# Patient Record
Sex: FEMALE | Race: WHITE | NOT HISPANIC OR LATINO | Employment: OTHER | URBAN - NONMETROPOLITAN AREA
[De-identification: names, ages, dates, MRNs, and addresses within clinical notes are randomized per-mention and may not be internally consistent; named-entity substitution may affect disease eponyms.]

---

## 2021-03-27 ENCOUNTER — HOSPITAL ENCOUNTER (INPATIENT)
Facility: HOSPITAL | Age: 86
LOS: 4 days | Discharge: HOME HEALTH | DRG: 205 | End: 2021-03-31
Attending: EMERGENCY MEDICINE | Admitting: FAMILY MEDICINE
Payer: COMMERCIAL

## 2021-03-27 ENCOUNTER — APPOINTMENT (EMERGENCY)
Dept: CT IMAGING | Facility: HOSPITAL | Age: 86
DRG: 205 | End: 2021-03-27
Payer: COMMERCIAL

## 2021-03-27 ENCOUNTER — APPOINTMENT (EMERGENCY)
Dept: RADIOLOGY | Facility: HOSPITAL | Age: 86
DRG: 205 | End: 2021-03-27
Payer: COMMERCIAL

## 2021-03-27 DIAGNOSIS — I50.9 CHF (CONGESTIVE HEART FAILURE) (HCC): ICD-10-CM

## 2021-03-27 DIAGNOSIS — J96.01 ACUTE RESPIRATORY FAILURE WITH HYPOXIA (HCC): ICD-10-CM

## 2021-03-27 DIAGNOSIS — E87.1 HYPONATREMIA: ICD-10-CM

## 2021-03-27 DIAGNOSIS — R09.02 HYPOXIA: Primary | ICD-10-CM

## 2021-03-27 PROBLEM — E78.2 MIXED HYPERLIPIDEMIA: Status: ACTIVE | Noted: 2021-03-27

## 2021-03-27 PROBLEM — E66.01 MORBID OBESITY (HCC): Status: ACTIVE | Noted: 2021-03-27

## 2021-03-27 PROBLEM — I48.0 PAROXYSMAL A-FIB (HCC): Status: ACTIVE | Noted: 2021-03-27

## 2021-03-27 PROBLEM — M10.49 OTHER SECONDARY GOUT, MULTIPLE SITES: Status: ACTIVE | Noted: 2021-03-27

## 2021-03-27 PROBLEM — I10 ESSENTIAL HYPERTENSION: Status: ACTIVE | Noted: 2021-03-27

## 2021-03-27 LAB
ALBUMIN SERPL BCP-MCNC: 3.9 G/DL (ref 3.5–5)
ALP SERPL-CCNC: 61 U/L (ref 46–116)
ALT SERPL W P-5'-P-CCNC: 27 U/L (ref 12–78)
ANION GAP SERPL CALCULATED.3IONS-SCNC: 3 MMOL/L (ref 4–13)
ANION GAP SERPL CALCULATED.3IONS-SCNC: 4 MMOL/L (ref 4–13)
AST SERPL W P-5'-P-CCNC: 37 U/L (ref 5–45)
BACTERIA UR QL AUTO: NORMAL /HPF
BASOPHILS # BLD AUTO: 0.01 THOUSANDS/ΜL (ref 0–0.1)
BASOPHILS NFR BLD AUTO: 0 % (ref 0–1)
BILIRUB SERPL-MCNC: 1.48 MG/DL (ref 0.2–1)
BILIRUB UR QL STRIP: NEGATIVE
BUN SERPL-MCNC: 16 MG/DL (ref 5–25)
BUN SERPL-MCNC: 17 MG/DL (ref 5–25)
CALCIUM SERPL-MCNC: 8.8 MG/DL (ref 8.3–10.1)
CALCIUM SERPL-MCNC: 9.6 MG/DL (ref 8.3–10.1)
CHLORIDE SERPL-SCNC: 88 MMOL/L (ref 100–108)
CHLORIDE SERPL-SCNC: 88 MMOL/L (ref 100–108)
CLARITY UR: CLEAR
CO2 SERPL-SCNC: 32 MMOL/L (ref 21–32)
CO2 SERPL-SCNC: 34 MMOL/L (ref 21–32)
COLOR UR: YELLOW
CREAT SERPL-MCNC: 1.04 MG/DL (ref 0.6–1.3)
CREAT SERPL-MCNC: 1.07 MG/DL (ref 0.6–1.3)
CRP SERPL QL: 3 MG/L
D DIMER PPP FEU-MCNC: 0.77 UG/ML FEU
EOSINOPHIL # BLD AUTO: 0.03 THOUSAND/ΜL (ref 0–0.61)
EOSINOPHIL NFR BLD AUTO: 1 % (ref 0–6)
ERYTHROCYTE [DISTWIDTH] IN BLOOD BY AUTOMATED COUNT: 14.9 % (ref 11.6–15.1)
FLUAV RNA RESP QL NAA+PROBE: NEGATIVE
FLUBV RNA RESP QL NAA+PROBE: NEGATIVE
GFR SERPL CREATININE-BSD FRML MDRD: 47 ML/MIN/1.73SQ M
GFR SERPL CREATININE-BSD FRML MDRD: 48 ML/MIN/1.73SQ M
GLUCOSE SERPL-MCNC: 124 MG/DL (ref 65–140)
GLUCOSE SERPL-MCNC: 135 MG/DL (ref 65–140)
GLUCOSE UR STRIP-MCNC: NEGATIVE MG/DL
HCT VFR BLD AUTO: 36.8 % (ref 34.8–46.1)
HGB BLD-MCNC: 12.3 G/DL (ref 11.5–15.4)
HGB UR QL STRIP.AUTO: ABNORMAL
IMM GRANULOCYTES # BLD AUTO: 0.01 THOUSAND/UL (ref 0–0.2)
IMM GRANULOCYTES NFR BLD AUTO: 0 % (ref 0–2)
KETONES UR STRIP-MCNC: NEGATIVE MG/DL
LACTATE SERPL-SCNC: 2 MMOL/L (ref 0.5–2)
LEUKOCYTE ESTERASE UR QL STRIP: NEGATIVE
LYMPHOCYTES # BLD AUTO: 0.49 THOUSANDS/ΜL (ref 0.6–4.47)
LYMPHOCYTES NFR BLD AUTO: 14 % (ref 14–44)
MCH RBC QN AUTO: 31.9 PG (ref 26.8–34.3)
MCHC RBC AUTO-ENTMCNC: 33.4 G/DL (ref 31.4–37.4)
MCV RBC AUTO: 95 FL (ref 82–98)
MONOCYTES # BLD AUTO: 0.29 THOUSAND/ΜL (ref 0.17–1.22)
MONOCYTES NFR BLD AUTO: 9 % (ref 4–12)
NEUTROPHILS # BLD AUTO: 2.58 THOUSANDS/ΜL (ref 1.85–7.62)
NEUTS SEG NFR BLD AUTO: 76 % (ref 43–75)
NITRITE UR QL STRIP: NEGATIVE
NON-SQ EPI CELLS URNS QL MICRO: NORMAL /HPF
NRBC BLD AUTO-RTO: 0 /100 WBCS
NT-PROBNP SERPL-MCNC: 3204 PG/ML
OSMOLALITY UR/SERPL-RTO: 267 MMOL/KG (ref 282–298)
PH UR STRIP.AUTO: 7.5 [PH]
PLATELET # BLD AUTO: 60 THOUSANDS/UL (ref 149–390)
PMV BLD AUTO: 9.8 FL (ref 8.9–12.7)
POTASSIUM SERPL-SCNC: 3.2 MMOL/L (ref 3.5–5.3)
POTASSIUM SERPL-SCNC: 3.7 MMOL/L (ref 3.5–5.3)
PROT SERPL-MCNC: 7.9 G/DL (ref 6.4–8.2)
PROT UR STRIP-MCNC: ABNORMAL MG/DL
RBC # BLD AUTO: 3.86 MILLION/UL (ref 3.81–5.12)
RBC #/AREA URNS AUTO: NORMAL /HPF
RSV RNA RESP QL NAA+PROBE: NEGATIVE
SARS-COV-2 RNA RESP QL NAA+PROBE: NEGATIVE
SODIUM SERPL-SCNC: 124 MMOL/L (ref 136–145)
SODIUM SERPL-SCNC: 125 MMOL/L (ref 136–145)
SP GR UR STRIP.AUTO: 1.01 (ref 1–1.03)
TROPONIN I SERPL-MCNC: <0.02 NG/ML
UROBILINOGEN UR QL STRIP.AUTO: 0.2 E.U./DL
WBC # BLD AUTO: 3.41 THOUSAND/UL (ref 4.31–10.16)
WBC #/AREA URNS AUTO: NORMAL /HPF

## 2021-03-27 PROCEDURE — 99285 EMERGENCY DEPT VISIT HI MDM: CPT

## 2021-03-27 PROCEDURE — 99222 1ST HOSP IP/OBS MODERATE 55: CPT | Performed by: FAMILY MEDICINE

## 2021-03-27 PROCEDURE — 86140 C-REACTIVE PROTEIN: CPT | Performed by: EMERGENCY MEDICINE

## 2021-03-27 PROCEDURE — 80053 COMPREHEN METABOLIC PANEL: CPT | Performed by: EMERGENCY MEDICINE

## 2021-03-27 PROCEDURE — 85379 FIBRIN DEGRADATION QUANT: CPT | Performed by: EMERGENCY MEDICINE

## 2021-03-27 PROCEDURE — 83935 ASSAY OF URINE OSMOLALITY: CPT | Performed by: FAMILY MEDICINE

## 2021-03-27 PROCEDURE — 71045 X-RAY EXAM CHEST 1 VIEW: CPT

## 2021-03-27 PROCEDURE — 71275 CT ANGIOGRAPHY CHEST: CPT

## 2021-03-27 PROCEDURE — 85025 COMPLETE CBC W/AUTO DIFF WBC: CPT | Performed by: EMERGENCY MEDICINE

## 2021-03-27 PROCEDURE — 80048 BASIC METABOLIC PNL TOTAL CA: CPT | Performed by: FAMILY MEDICINE

## 2021-03-27 PROCEDURE — 96374 THER/PROPH/DIAG INJ IV PUSH: CPT

## 2021-03-27 PROCEDURE — 99285 EMERGENCY DEPT VISIT HI MDM: CPT | Performed by: EMERGENCY MEDICINE

## 2021-03-27 PROCEDURE — G1004 CDSM NDSC: HCPCS

## 2021-03-27 PROCEDURE — 74174 CTA ABD&PLVS W/CONTRAST: CPT

## 2021-03-27 PROCEDURE — 83880 ASSAY OF NATRIURETIC PEPTIDE: CPT | Performed by: EMERGENCY MEDICINE

## 2021-03-27 PROCEDURE — 0241U HB NFCT DS VIR RESP RNA 4 TRGT: CPT | Performed by: EMERGENCY MEDICINE

## 2021-03-27 PROCEDURE — 84300 ASSAY OF URINE SODIUM: CPT | Performed by: FAMILY MEDICINE

## 2021-03-27 PROCEDURE — 81001 URINALYSIS AUTO W/SCOPE: CPT | Performed by: EMERGENCY MEDICINE

## 2021-03-27 PROCEDURE — 83605 ASSAY OF LACTIC ACID: CPT | Performed by: EMERGENCY MEDICINE

## 2021-03-27 PROCEDURE — 36415 COLL VENOUS BLD VENIPUNCTURE: CPT | Performed by: EMERGENCY MEDICINE

## 2021-03-27 PROCEDURE — 75635 CT ANGIO ABDOMINAL ARTERIES: CPT

## 2021-03-27 PROCEDURE — 83930 ASSAY OF BLOOD OSMOLALITY: CPT | Performed by: FAMILY MEDICINE

## 2021-03-27 PROCEDURE — 93005 ELECTROCARDIOGRAM TRACING: CPT

## 2021-03-27 PROCEDURE — 84484 ASSAY OF TROPONIN QUANT: CPT | Performed by: EMERGENCY MEDICINE

## 2021-03-27 RX ORDER — FUROSEMIDE 40 MG/1
40 TABLET ORAL 2 TIMES DAILY
COMMUNITY
Start: 2020-12-09

## 2021-03-27 RX ORDER — CALCIUM CARBONATE 200(500)MG
1000 TABLET,CHEWABLE ORAL DAILY PRN
Status: DISCONTINUED | OUTPATIENT
Start: 2021-03-27 | End: 2021-03-31 | Stop reason: HOSPADM

## 2021-03-27 RX ORDER — ACETAMINOPHEN 325 MG/1
650 TABLET ORAL EVERY 6 HOURS PRN
Status: DISCONTINUED | OUTPATIENT
Start: 2021-03-27 | End: 2021-03-31 | Stop reason: HOSPADM

## 2021-03-27 RX ORDER — ALLOPURINOL 100 MG/1
200 TABLET ORAL DAILY
Status: DISCONTINUED | OUTPATIENT
Start: 2021-03-27 | End: 2021-03-31 | Stop reason: HOSPADM

## 2021-03-27 RX ORDER — AMLODIPINE BESYLATE 5 MG/1
5 TABLET ORAL DAILY
Status: DISCONTINUED | OUTPATIENT
Start: 2021-03-27 | End: 2021-03-31 | Stop reason: HOSPADM

## 2021-03-27 RX ORDER — SODIUM CHLORIDE 9 MG/ML
125 INJECTION, SOLUTION INTRAVENOUS CONTINUOUS
Status: DISCONTINUED | OUTPATIENT
Start: 2021-03-27 | End: 2021-03-28

## 2021-03-27 RX ORDER — PRAVASTATIN SODIUM 40 MG
TABLET ORAL
COMMUNITY
Start: 2021-03-26

## 2021-03-27 RX ORDER — ATENOLOL 25 MG/1
25 TABLET ORAL DAILY
COMMUNITY
Start: 2021-03-19

## 2021-03-27 RX ORDER — MECLIZINE HYDROCHLORIDE 25 MG/1
TABLET ORAL
COMMUNITY
Start: 2021-03-19

## 2021-03-27 RX ORDER — AMLODIPINE BESYLATE 5 MG/1
5 TABLET ORAL DAILY
COMMUNITY
Start: 2021-02-15

## 2021-03-27 RX ORDER — FLUTICASONE PROPIONATE 50 MCG
1 SPRAY, SUSPENSION (ML) NASAL DAILY
COMMUNITY

## 2021-03-27 RX ORDER — POTASSIUM CHLORIDE 20 MEQ/1
40 TABLET, EXTENDED RELEASE ORAL ONCE
Status: COMPLETED | OUTPATIENT
Start: 2021-03-27 | End: 2021-03-27

## 2021-03-27 RX ORDER — BRINZOLAMIDE 10 MG/ML
1 SUSPENSION/ DROPS OPHTHALMIC 3 TIMES DAILY
Status: DISCONTINUED | OUTPATIENT
Start: 2021-03-27 | End: 2021-03-29

## 2021-03-27 RX ORDER — ONDANSETRON 2 MG/ML
4 INJECTION INTRAMUSCULAR; INTRAVENOUS EVERY 6 HOURS PRN
Status: DISCONTINUED | OUTPATIENT
Start: 2021-03-27 | End: 2021-03-31 | Stop reason: HOSPADM

## 2021-03-27 RX ORDER — ATENOLOL 25 MG/1
25 TABLET ORAL DAILY
Status: DISCONTINUED | OUTPATIENT
Start: 2021-03-27 | End: 2021-03-31 | Stop reason: HOSPADM

## 2021-03-27 RX ORDER — METOLAZONE 5 MG/1
5 TABLET ORAL DAILY
COMMUNITY
Start: 2021-03-16 | End: 2021-03-31 | Stop reason: HOSPADM

## 2021-03-27 RX ORDER — PRAVASTATIN SODIUM 40 MG
40 TABLET ORAL DAILY
Status: DISCONTINUED | OUTPATIENT
Start: 2021-03-27 | End: 2021-03-31 | Stop reason: HOSPADM

## 2021-03-27 RX ORDER — FLUTICASONE PROPIONATE 50 MCG
1 SPRAY, SUSPENSION (ML) NASAL DAILY
Status: DISCONTINUED | OUTPATIENT
Start: 2021-03-27 | End: 2021-03-31 | Stop reason: HOSPADM

## 2021-03-27 RX ORDER — ALLOPURINOL 100 MG/1
TABLET ORAL
COMMUNITY
Start: 2020-12-28

## 2021-03-27 RX ORDER — BRINZOLAMIDE 1 %
SUSPENSION, DROPS(FINAL DOSAGE FORM)(ML) OPHTHALMIC (EYE)
COMMUNITY

## 2021-03-27 RX ORDER — BENAZEPRIL HYDROCHLORIDE 10 MG/1
TABLET ORAL
COMMUNITY
Start: 2021-03-22

## 2021-03-27 RX ORDER — FUROSEMIDE 10 MG/ML
40 INJECTION INTRAMUSCULAR; INTRAVENOUS ONCE
Status: COMPLETED | OUTPATIENT
Start: 2021-03-27 | End: 2021-03-27

## 2021-03-27 RX ADMIN — BRINZOLAMIDE 1 DROP: 10 SUSPENSION/ DROPS OPHTHALMIC at 20:04

## 2021-03-27 RX ADMIN — ATENOLOL 25 MG: 25 TABLET ORAL at 15:08

## 2021-03-27 RX ADMIN — POTASSIUM CHLORIDE 40 MEQ: 1500 TABLET, EXTENDED RELEASE ORAL at 09:59

## 2021-03-27 RX ADMIN — PRAVASTATIN SODIUM 40 MG: 40 TABLET ORAL at 15:08

## 2021-03-27 RX ADMIN — ALLOPURINOL 200 MG: 100 TABLET ORAL at 15:08

## 2021-03-27 RX ADMIN — FUROSEMIDE 40 MG: 10 INJECTION, SOLUTION INTRAVENOUS at 09:59

## 2021-03-27 RX ADMIN — AMLODIPINE BESYLATE 5 MG: 5 TABLET ORAL at 15:08

## 2021-03-27 RX ADMIN — IOHEXOL 130 ML: 350 INJECTION, SOLUTION INTRAVENOUS at 09:28

## 2021-03-27 RX ADMIN — SODIUM CHLORIDE 125 ML/HR: 0.9 INJECTION, SOLUTION INTRAVENOUS at 22:27

## 2021-03-27 RX ADMIN — SODIUM CHLORIDE 125 ML/HR: 0.9 INJECTION, SOLUTION INTRAVENOUS at 15:00

## 2021-03-27 RX ADMIN — RIVAROXABAN 20 MG: 20 TABLET, FILM COATED ORAL at 15:08

## 2021-03-27 NOTE — H&P
[address] [address], 80 y o  female MRN: [id number]  Unit/Bed#: -01 Encounter: [id number]  Primary Care Provider: Ragini Jolly DO   Date and time admitted to hospital: 3/27/2021  8:02 AM    * Acute respiratory failure with hypoxia [facility])  Assessment & Plan  Patient has acute respiratory failure with hypoxia  Currently requiring 2 L of oxygen to maintain pulse ox of more than 92%  Does not use any oxygen at home  Acute respiratory failure is probably secondary to atelectasis and obesity hypoventilation syndrome  No evidence of large effusions or pneumonia noted on CT chest   Unlikely pulmonary embolism as patient is already on anticoagulation    Hyponatremia  Assessment & Plan  Patient appears to have hypovolemic hyponatremia at this time  She was recently increased on her diuretics and metolazone was added  She appears to have inadequate oral intake  Will check urine osmolarity, serum osmolarity and serum sodium  Will place on IV fluid hydration for now and recheck BMP q 12 for now  Correct serum sodium by 8 mEq 24 hours  Consult placed to Nephrology  Check TSH  CT chest does not show any evidence of malignancy  Other secondary gout, multiple sites  Assessment & Plan  Stable at this time  Not in exacerbation  Continue allopurinol    Paroxysmal A-fib (HCC)  Assessment & Plan  Currently rate controlled  Continue to atenolol for rate control and Xarelto for anticoagulation  CTA chest shows katheryn cava  No need for venous Dopplers to be done as patient is already on anticoagulation    Mixed hyperlipidemia  Assessment & Plan  Continue statin therapy    Essential hypertension  Assessment & Plan  Blood pressure is currently controlled  Will continue Norvasc and to Lizzie  Hold Lasix, metolazone, Lotensin for now    Morbid obesity ([facility] [address] )  Assessment & Plan  Will need counseling on diet exercise and lifestyle modification    BMI is 36 15      VTE Prophylaxis: Rivaroxaban (Xarelto)  / sequential compression device   Code Status:  Full code  POLST: There is no POLST form on file for this patient (pre-hospital)  Discussion with family:  Discussed with daughter at bedside    Anticipated Length of Stay:  Patient will be admitted on an Inpatient basis with an anticipated length of stay of  more than 2 midnights  Justification for Hospital Stay:  Shortness of breath and electrolyte abnormality    Total Time for Visit, including Counseling / Coordination of Care: 45 minutes  Greater than 50% of this total time spent on direct patient counseling and coordination of care  Chief Complaint:   Shortness of breath    History of Present Illness:    Myrtle Chavez is a 80 y o  female who presents with shortness of breath  Patient states that she has not been feeling well for the last few days  Her legs were getting more swollen and her family doctor added metolazone to her regimen of Lasix  Patient states that she thinks she is eating and drinking okay  Denies any abdominal pain but does complain of some nausea and intermittent episodes of diarrhea  Denies any fevers or chills or sick contacts  Review of Systems:    Review of Systems   Constitutional: Positive for appetite change and fatigue  Negative for chills and fever  HENT: Negative for hearing loss, sore throat and trouble swallowing  Eyes: Negative for photophobia, discharge and visual disturbance  Respiratory: Positive for shortness of breath  Negative for chest tightness  Cardiovascular: Positive for leg swelling  Negative for chest pain and palpitations  Gastrointestinal: Negative for abdominal pain, blood in stool and vomiting  Endocrine: Negative for polydipsia and polyuria  Genitourinary: Negative for difficulty urinating, dysuria, flank pain and hematuria  Musculoskeletal: Negative for back pain and gait problem  Skin: Negative for rash     Allergic/Immunologic: Negative for environmental allergies and food allergies  Neurological: Positive for weakness  Negative for dizziness, seizures, syncope and headaches  Hematological: Does not bruise/bleed easily  Psychiatric/Behavioral: Negative for behavioral problems  All other systems reviewed and are negative  Past Medical and Surgical History:     Past Medical History:   Diagnosis Date    A-fib [facility])     Coronary artery disease     Dizziness     Edema     GERD (gastroesophageal reflux disease)     High cholesterol     Hypertension        Past Surgical History:   Procedure Laterality Date    CHOLECYSTECTOMY OPEN         Meds/Allergies:    Prior to Admission medications    Medication Sig Start Date End Date Taking? Authorizing Provider   allopurinol (ZYLOPRIM) 100 mg tablet TAKE TWO TABLETS BY MOUTH DAILY 12/28/20  Yes Historical Provider, MD   amLODIPine (NORVASC) 5 mg tablet Take 5 mg by mouth daily 2/15/21  Yes Historical Provider, MD   atenolol (TENORMIN) 25 mg tablet Take by mouth 3/19/21  Yes Historical Provider, MD   benazepril (LOTENSIN) 10 mg tablet TAKE ONE TABLET BY MOUTH DAILY  3/22/21  Yes Historical Provider, MD   brinzolamide (Azopt) 1 % ophthalmic suspension    Yes Historical Provider, MD   fluticasone (FLONASE) 50 mcg/act nasal spray    Yes Historical Provider, MD   furosemide (LASIX) 40 mg tablet TAKE ONE TABLET BY MOUTH TWICE DAILY 6 hours apart 12/9/20  Yes Historical Provider, MD   meclizine (ANTIVERT) 25 mg tablet Take by mouth 3/19/21  Yes Historical Provider, MD   metolazone (ZAROXOLYN) 5 mg tablet Take 5 mg by mouth daily 3/16/21 4/15/21 Yes Historical Provider, MD   pravastatin (PRAVACHOL) 40 mg tablet TAKE ONE TABLET BY MOUTH DAILY  3/26/21  Yes Historical Provider, MD   rivaroxaban (Xarelto) 15 mg tablet TAKE ONE TABLET BY MOUTH DAILY  3/22/21  Yes Historical Provider, MD     I have reviewed home medications with patient personally      Allergies: No Known Allergies    Social History: Marital Status:    Social History     Substance and Sexual Activity   Alcohol Use Not Currently     Social History     Tobacco Use   Smoking Status Never Smoker   Smokeless Tobacco Never Used     Social History     Substance and Sexual Activity   Drug Use Not Currently       Family History:    Family History   Problem Relation Age of Onset    Hypertension Father        Physical Exam:     Vitals:   Blood Pressure: 138/55 (03/27/21 1248)  Pulse: 58 (03/27/21 1248)  Temperature: 98 °F (36 7 °C) (03/27/21 1248)  Respirations: 16 (03/27/21 1248)  Height: 5' 6" (167 6 cm) (03/27/21 4842)  Weight - Scale: 102 kg (224 lb) (03/27/21 0811)  SpO2: 92 % (03/27/21 1248)    Physical Exam  Vitals signs and nursing note reviewed  Constitutional:       Appearance: Normal appearance  HENT:      Head: Normocephalic and atraumatic  Right Ear: External ear normal       Left Ear: External ear normal       Nose: Nose normal       Mouth/Throat:      Mouth: Mucous membranes are dry  Eyes:      Pupils: Pupils are equal, round, and reactive to light  Neck:      Musculoskeletal: Normal range of motion and neck supple  Cardiovascular:      Rate and Rhythm: Normal rate and regular rhythm  Heart sounds: Normal heart sounds  Pulmonary:      Effort: Pulmonary effort is normal       Breath sounds: Normal breath sounds  Abdominal:      General: Bowel sounds are normal       Palpations: Abdomen is soft  Tenderness: There is no abdominal tenderness  Musculoskeletal: Normal range of motion  Right lower leg: Edema present  Left lower leg: Edema present  Skin:     General: Skin is warm and dry  Capillary Refill: Capillary refill takes less than 2 seconds  Neurological:      General: No focal deficit present  Mental Status: She is alert and oriented to person, place, and time     Psychiatric:         Mood and Affect: Mood normal              Additional Data:     Lab Results: I have personally reviewed pertinent reports  Results from last 7 days   Lab Units 03/27/21  0825   WBC Thousand/uL 3 41*   HEMOGLOBIN g/dL 12 3   HEMATOCRIT % 36 8   PLATELETS Thousands/uL 60*   NEUTROS PCT % 76*   LYMPHS PCT % 14   MONOS PCT % 9   EOS PCT % 1     Results from last 7 days   Lab Units 03/27/21  0825   SODIUM mmol/L 125*   POTASSIUM mmol/L 3 2*   CHLORIDE mmol/L 88*   CO2 mmol/L 34*   BUN mg/dL 16   CREATININE mg/dL 1 07   ANION GAP mmol/L 3*   CALCIUM mg/dL 9 6   ALBUMIN g/dL 3 9   TOTAL BILIRUBIN mg/dL 1 48*   ALK PHOS U/L 61   ALT U/L 27   AST U/L 37   GLUCOSE RANDOM mg/dL 124                 Results from last 7 days   Lab Units 03/27/21  0825   LACTIC ACID mmol/L 2 0       Imaging: I have personally reviewed pertinent reports  CTA abdominal w run off w wo contrast   Final Result by Saeed Mercado MD (03/27 7738)         1  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 as described above  An intravascular lesion or mass cannot be excluded based on this study alone  Additional imaging is recommended such as an MRI or potentially a CT venogram    2  Diffuse atherosclerotic disease as described above with no significant lesions as described above  3  Soft tissue edema loaded below the knees bilaterally with superficial varicosities present  4  Cardiac enlargement  5  Trace right pleural effusion  6  DJD  The significant findings were discussed with Dr Romel Goldman directly by telephone  Workstation performed: [id number]         XR chest portable   ED Interpretation by Jessie Howell DO (03/27 8011)   Cardiomegaly CHF changes      CTA dissection protocol chest abdomen pelvis w wo contrast    (Results Pending)       EKG, Pathology, and Other Studies Reviewed on Admission:   · EKG:  Sinus rhythm    Allscripts / Epic Records Reviewed: Yes     ** Please Note: This note has been constructed using a voice recognition system   **

## 2021-03-27 NOTE — PLAN OF CARE
Problem: Potential for Falls  Goal: Patient will remain free of falls  Description: INTERVENTIONS:  - Assess patient frequently for physical needs  -  Identify cognitive and physical deficits and behaviors that affect risk of falls    -  [place] fall precautions as indicated by assessment   - Educate patient/family on patient safety including physical limitations  - Instruct patient to call for assistance with activity based on assessment  - Modify environment to reduce risk of injury  - Consider OT/PT consult to assist with strengthening/mobility  Outcome: Progressing     Problem: PAIN - ADULT  Goal: Verbalizes/displays adequate comfort level or baseline comfort level  Description: Interventions:  - Encourage patient to monitor pain and request assistance  - Assess pain using appropriate pain scale  - Administer analgesics based on type and severity of pain and evaluate response  - Implement non-pharmacological measures as appropriate and evaluate response  - Consider cultural and social influences on pain and pain management  - Notify physician/advanced practitioner if interventions unsuccessful or patient reports new pain  Outcome: Progressing     Problem: INFECTION - ADULT  Goal: Absence or prevention of progression during hospitalization  Description: INTERVENTIONS:  - Assess and monitor for signs and symptoms of infection  - Monitor lab/diagnostic results  - Monitor all insertion sites, i e  indwelling lines, tubes, and drains  - Monitor endotracheal if appropriate and nasal secretions for changes in amount and color  - [place] appropriate cooling/warming therapies per order  - Administer medications as ordered  - Instruct and encourage patient and family to use good hand hygiene technique  - Identify and instruct in appropriate isolation precautions for identified infection/condition  Outcome: Progressing  Goal: Absence of fever/infection during neutropenic period  Description: INTERVENTIONS:  - Monitor WBC    Outcome: Progressing     Problem: SAFETY ADULT  Goal: Patient will remain free of falls  Description: INTERVENTIONS:  - Assess patient frequently for physical needs  -  Identify cognitive and physical deficits and behaviors that affect risk of falls    -  [place] fall precautions as indicated by assessment   - Educate patient/family on patient safety including physical limitations  - Instruct patient to call for assistance with activity based on assessment  - Modify environment to reduce risk of injury  - Consider OT/PT consult to assist with strengthening/mobility  Outcome: Progressing  Goal: Maintain or return to baseline ADL function  Description: INTERVENTIONS:  -  Assess patient's ability to carry out ADLs; assess patient's baseline for ADL function and identify physical deficits which impact ability to perform ADLs (bathing, care of mouth/teeth, toileting, grooming, dressing, etc )  - Assess/evaluate cause of self-care deficits   - Assess range of motion  - Assess patient's mobility; develop plan if impaired  - Assess patient's need for assistive devices and provide as appropriate  - Encourage maximum independence but intervene and supervise when necessary  - Involve family in performance of ADLs  - Assess for home care needs following discharge   - Consider OT consult to assist with ADL evaluation and planning for discharge  - Provide patient education as appropriate  Outcome: Progressing  Goal: Maintain or return mobility status to optimal level  Description: INTERVENTIONS:  - Assess patient's baseline mobility status (ambulation, transfers, stairs, etc )    - Identify cognitive and physical deficits and behaviors that affect mobility  - Identify mobility aids required to assist with transfers and/or ambulation (gait belt, sit-to-stand, lift, walker, cane, etc )  - [place] fall precautions as indicated by assessment  - Record patient progress and toleration of activity level on Mobility SBAR; progress patient to next Phase/Stage  - Instruct patient to call for assistance with activity based on assessment  - Consider rehabilitation consult to assist with strengthening/weightbearing, etc   Outcome: Progressing     Problem: DISCHARGE PLANNING  Goal: Discharge to home or other facility with appropriate resources  Description: INTERVENTIONS:  - Identify barriers to discharge w/patient and caregiver  - Arrange for needed discharge resources and transportation as appropriate  - Identify discharge learning needs (meds, wound care, etc )  - Arrange for interpretive services to assist at discharge as needed  - Refer to Case Management Department for coordinating discharge planning if the patient needs post-hospital services based on physician/advanced practitioner order or complex needs related to functional status, cognitive ability, or social support system  Outcome: Progressing     Problem: Knowledge Deficit  Goal: Patient/family/caregiver demonstrates understanding of disease process, treatment plan, medications, and discharge instructions  Description: Complete learning assessment and assess knowledge base    Interventions:  - Provide teaching at level of understanding  - Provide teaching via preferred learning methods  Outcome: Progressing

## 2021-03-27 NOTE — ED PROVIDER NOTES
History  Chief Complaint   Patient presents with    Shortness of Breath     low pulse ox when EMS arrived and increased leg edema     80year-old female complains of difficulty sleeping last night with dyspnea and left lower extremity pain, nonspecific  Also notes ongoing abdominal pain over the past few weeks and lower extremity swelling over the past week  Just past medical history that includes atrial fibrillation, hypertension, currently taking rivaroxaban  She denies chest pain and back pain  She denies fever and cough  Patient lives with son and asked him to call ambulance today  EMS notes oxygen saturation on room air 88%      History provided by:  Patient and EMS personnel  Shortness of Breath  Severity:  Mild  Onset quality:  Gradual  Timing:  Constant  Progression:  Unchanged  Chronicity:  New  Relieved by:  Oxygen  Associated symptoms: abdominal pain    Associated symptoms: no fever and no hemoptysis        Prior to Admission Medications   Prescriptions Last Dose Informant Patient Reported? Taking?   allopurinol (ZYLOPRIM) 100 mg tablet 3/26/2021 at Unknown time  Yes Yes   Sig: TAKE TWO TABLETS BY MOUTH DAILY   amLODIPine (NORVASC) 5 mg tablet 3/26/2021 at Unknown time  Yes Yes   Sig: Take 5 mg by mouth daily   atenolol (TENORMIN) 25 mg tablet 3/26/2021 at Unknown time  Yes Yes   Sig: Take by mouth   benazepril (LOTENSIN) 10 mg tablet 3/26/2021 at Unknown time  Yes Yes   Sig: TAKE ONE TABLET BY MOUTH DAILY     brinzolamide (Azopt) 1 % ophthalmic suspension 3/26/2021 at Unknown time  Yes Yes   fluticasone (FLONASE) 50 mcg/act nasal spray 3/26/2021 at Unknown time  Yes Yes   furosemide (LASIX) 40 mg tablet 3/26/2021 at Unknown time  Yes Yes   Sig: TAKE ONE TABLET BY MOUTH TWICE DAILY 6 hours apart   meclizine (ANTIVERT) 25 mg tablet 3/26/2021 at Unknown time  Yes Yes   Sig: Take by mouth   metolazone (ZAROXOLYN) 5 mg tablet 3/26/2021 at Unknown time  Yes Yes   Sig: Take 5 mg by mouth daily pravastatin (PRAVACHOL) 40 mg tablet 3/26/2021 at Unknown time  Yes Yes   Sig: TAKE ONE TABLET BY MOUTH DAILY  rivaroxaban (Xarelto) 15 mg tablet 3/26/2021 at Unknown time  Yes Yes   Sig: TAKE ONE TABLET BY MOUTH DAILY  Facility-Administered Medications: None       Past Medical History:   Diagnosis Date    A-fib ([facility] [address] )     Coronary artery disease     Dizziness     Edema     GERD (gastroesophageal reflux disease)     High cholesterol     Hypertension        Past Surgical History:   Procedure Laterality Date    CHOLECYSTECTOMY OPEN         Family History   Problem Relation Age of Onset    Hypertension Father      I have reviewed and agree with the history as documented  E-Cigarette/Vaping    E-Cigarette Use Never User      E-Cigarette/Vaping Substances     Social History     Tobacco Use    Smoking status: Never Smoker    Smokeless tobacco: Never Used   Substance Use Topics    Alcohol use: Not Currently    Drug use: Not Currently       Review of Systems   Constitutional: Negative for fever  Respiratory: Positive for shortness of breath  Negative for hemoptysis  Gastrointestinal: Positive for abdominal pain  All other systems reviewed and are negative  Physical Exam  Physical Exam  Vitals signs and nursing note reviewed  Constitutional:       General: She is not in acute distress  Appearance: She is obese  She is not ill-appearing  Comments: Pleasant, comfortable-appearing, calm, conversational   HENT:      Head: Normocephalic and atraumatic  Eyes:      Conjunctiva/sclera: Conjunctivae normal       Pupils: Pupils are equal, round, and reactive to light  Neck:      Musculoskeletal: Neck supple  Cardiovascular:      Rate and Rhythm: Normal rate and regular rhythm  Heart sounds: Normal heart sounds        Comments: Intact femoral, popliteal pulses bilaterally  Right dorsal pedal pulses intact, but left dorsal pedal not palpable, left toes capillary refill intact, warm  Pulmonary:      Effort: Pulmonary effort is normal       Breath sounds: Rales present  Abdominal:      General: Bowel sounds are normal  There is no distension  Palpations: Abdomen is soft  Tenderness: There is no abdominal tenderness  Musculoskeletal: Normal range of motion  Right lower leg: Edema present  Left lower leg: Edema present  Skin:     General: Skin is warm and dry  Neurological:      Mental Status: She is alert and oriented to person, place, and time  Cranial Nerves: No cranial nerve deficit  Coordination: Coordination normal    Psychiatric:         Behavior: Behavior normal          Thought Content:  Thought content normal          Judgment: Judgment normal          Vital Signs  ED Triage Vitals   Temperature Pulse Respirations Blood Pressure SpO2   03/27/21 0811 03/27/21 0811 03/27/21 0811 03/27/21 0811 03/27/21 0811   98 1 °F (36 7 °C) 64 16 136/62 (!) 89 %      Temp src Heart Rate Source Patient Position - Orthostatic VS BP Location FiO2 (%)   -- 03/27/21 0922 03/27/21 1115 03/27/21 0811 --    Monitor Lying Left arm       Pain Score       03/27/21 0920       3           Vitals:    03/27/21 1100 03/27/21 1115 03/27/21 1248 03/27/21 1713   BP: 145/67 128/61 138/55 109/67   Pulse: 59 58 58 (!) 48   Patient Position - Orthostatic VS:  Lying           Visual Acuity      ED Medications  Medications   amLODIPine (NORVASC) tablet 5 mg (5 mg Oral Given 3/27/21 1508)   allopurinol (ZYLOPRIM) tablet 200 mg (200 mg Oral Given 3/27/21 1508)   atenolol (TENORMIN) tablet 25 mg (25 mg Oral Given 3/27/21 1508)   brinzolamide (AZOPT) 1 % ophthalmic suspension 1 drop (1 drop Both Eyes Refused 3/27/21 1508)   fluticasone (FLONASE) 50 mcg/act nasal spray 1 spray (1 spray Each Nare Refused 3/27/21 1508)   pravastatin (PRAVACHOL) tablet 40 mg (40 mg Oral Given 3/27/21 1508)   sodium chloride 0 9 % infusion (125 mL/hr Intravenous New Bag 3/27/21 1500)   acetaminophen (TYLENOL) tablet 650 mg (has no administration in time range)   ondansetron (ZOFRAN) injection 4 mg (has no administration in time range)   calcium carbonate (TUMS) chewable tablet 1,000 mg (has no administration in time range)   rivaroxaban (XARELTO) tablet 20 mg (20 mg Oral Given 3/27/21 1508)   iohexol (OMNIPAQUE) 350 MG/ML injection (SINGLE-DOSE) 130 mL (130 mL Intravenous Given 3/27/21 0928)   potassium chloride (K-DUR,KLOR-CON) CR tablet 40 mEq (40 mEq Oral Given 3/27/21 0959)   furosemide (LASIX) injection 40 mg (40 mg Intravenous Given 3/27/21 0959)       Diagnostic Studies  Results Reviewed     Procedure Component Value Units Date/Time    Osmolality-"If this is regarding a toxic alcohol, STOP  Test is not routinely indicated  Please consult medical  on call for further guidance " [[id number]] Collected: 03/27/21 0825    Lab Status: In process Specimen: Blood Updated: 03/27/21 1502    Urine Microscopic [[id number]]  (Normal) Collected: 03/27/21 0859    Lab Status: Final result Specimen: Urine, Indwelling Perez Catheter Updated: 03/27/21 0917     RBC, UA 2-4 /hpf      WBC, UA 0-1 /hpf      Epithelial Cells Occasional /hpf      Bacteria, UA Occasional /hpf     COVID19, Influenza A/B, RSV PCR, [facility] [[id number]]  (Normal) Collected: 03/27/21 0825    Lab Status: Final result Specimen: Nares from Nose Updated: 03/27/21 0910     SARS-CoV-2 Negative     INFLUENZA A PCR Negative     INFLUENZA B PCR Negative     RSV PCR Negative    Narrative: This test has been authorized by FDA under an EUA (Emergency Use Assay) for use by authorized laboratories  Clinical caution and judgement should be used with the interpretation of these results with consideration of the clinical impression and other laboratory testing  Testing reported as "Positive" or "Negative" has been proven to be accurate according to standard laboratory validation requirements    All testing is performed with control materials showing appropriate reactivity at standard intervals      UA (URINE) with reflex to Scope [[id number]]  (Abnormal) Collected: 03/27/21 0859    Lab Status: Final result Specimen: Urine, Indwelling Perez Catheter Updated: 03/27/21 0907     Color, UA Yellow     Clarity, UA Clear     Specific Gravity, UA 1 010     pH, UA 7 5     Leukocytes, UA Negative     Nitrite, UA Negative     Protein, UA Trace mg/dl      Glucose, UA Negative mg/dl      Ketones, UA Negative mg/dl      Urobilinogen, UA 0 2 E U /dl      Bilirubin, UA Negative     Blood, UA Trace-lysed    C-reactive protein [[id number]]  (Abnormal) Collected: 03/27/21 0825    Lab Status: Final result Specimen: Blood from Arm, Right Updated: 03/27/21 0856     CRP 3 0 mg/L     Comprehensive metabolic panel [[id number]]  (Abnormal) Collected: 03/27/21 0825    Lab Status: Final result Specimen: Blood from Arm, Right Updated: 03/27/21 0856     Sodium 125 mmol/L      Potassium 3 2 mmol/L      Chloride 88 mmol/L      CO2 34 mmol/L      ANION GAP 3 mmol/L      BUN 16 mg/dL      Creatinine 1 07 mg/dL      Glucose 124 mg/dL      Calcium 9 6 mg/dL      AST 37 U/L      ALT 27 U/L      Alkaline Phosphatase 61 U/L      Total Protein 7 9 g/dL      Albumin 3 9 g/dL      Total Bilirubin 1 48 mg/dL      eGFR 47 ml/min/1 73sq m     Narrative:      [facility] guidelines for Chronic Kid[facility] Disease (CKD):     Stage 1 with normal or high GFR (GFR > 90 mL/min/1 73 square meters)    Stage 2 Mild CKD (GFR = 60-89 mL/min/1 73 square meters)    Stage 3A Moderate CKD (GFR = 45-59 mL/min/1 73 square meters)    Stage 3B Moderate CKD (GFR = 30-44 mL/min/1 73 square meters)    Stage 4 Severe CKD (GFR = 15-29 mL/min/1 73 square meters)    Stage 5 End Stage CKD (GFR <15 mL/min/1 73 square meters)  Note: GFR calculation is accurate only with a steady state creatinine    NT-BNP PRO [[id number]]  (Abnormal) Collected: 03/27/21 0825    Lab Status: Final result Specimen: Blood from Arm, Right Updated: 03/27/21 0856     NT-proBNP 3,204 pg/mL     CBC and differential [[id number]]  (Abnormal) Collected: 03/27/21 0825    Lab Status: Final result Specimen: Blood from Arm, Right Updated: 03/27/21 0854     WBC 3 41 Thousand/uL      RBC 3 86 Million/uL      Hemoglobin 12 3 g/dL      Hematocrit 36 8 %      MCV 95 fL      MCH 31 9 pg      MCHC 33 4 g/dL      RDW 14 9 %      MPV 9 8 fL      Platelets 60 Thousands/uL      nRBC 0 /100 WBCs      Neutrophils Relative 76 %      Immat GRANS % 0 %      Lymphocytes Relative 14 %      Monocytes Relative 9 %      Eosinophils Relative 1 %      Basophils Relative 0 %      Neutrophils Absolute 2 58 Thousands/µL      Immature Grans Absolute 0 01 Thousand/uL      Lymphocytes Absolute 0 49 Thousands/µL      Monocytes Absolute 0 29 Thousand/µL      Eosinophils Absolute 0 03 Thousand/µL      Basophils Absolute 0 01 Thousands/µL     Narrative:       No Clots    Lactic acid, plasma [[id number]]  (Normal) Collected: 03/27/21 0825    Lab Status: Final result Specimen: Blood from Arm, Right Updated: 03/27/21 0854     LACTIC ACID 2 0 mmol/L     Narrative:      Result may be elevated if tourniquet was used during collection  Troponin I [[id number]]  (Normal) Collected: 03/27/21 0825    Lab Status: Final result Specimen: Blood from Arm, Right Updated: 03/27/21 0853     Troponin I <0 02 ng/mL     D-dimer, quantitative [[id number]]  (Abnormal) Collected: 03/27/21 0825    Lab Status: Final result Specimen: Blood from Arm, Right Updated: 03/27/21 0847     D-Dimer, Quant 0 77 ug/ml FEU                  CTA abdominal w run off w wo contrast   Final Result by Noy Aguilar MD (03/27 1259)         1  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 as described above  An intravascular lesion or mass cannot be excluded based on this study alone   Additional imaging is recommended such as an MRI or potentially a CT venogram    2  Diffuse atherosclerotic disease as described above with no significant lesions as described above  3  Soft tissue edema loaded below the knees bilaterally with superficial varicosities present  4  Cardiac enlargement  5  Trace right pleural effusion  6  DJD  The significant findings were discussed with Dr Evgeny Durand directly by telephone  Workstation performed: [id number]         XR chest portable   ED Interpretation by Ewa Villafana DO (03/27 1705)   Cardiomegaly CHF changes      Final Result by Deisy Mascorro DO (03/27 1529)      Central vascular congestion  No focal infiltrates  Workstation performed: [id number]         CTA dissection protocol chest abdomen pelvis w wo contrast    (Results Pending)              Procedures  Procedures         ED Course  ED Course as of Mar 27 1840   Sat Mar 27, 2021   9 [facility]  Vascular Tech Yuliya Edouard notes no arterial studies when on call      0839 EKG 8:34 a m  Atrial fibrillation rate 63 right axis QRS 94 nonspecific T-wave changes no ST elevation or depression interpreted by me      0935 NT-proBNP(!): 3,204   0935 C-REACTIVE PROTEIN(!): 3 0   0935 LACTIC ACID: 2 0   0936 Sodium(!): 125   0936 Potassium(!): 3 2   0936 Troponin I: <0 02   0936 D-Dimer, Quant(!): 0 77   1207 Radiology [facility] enlarged IVC ? mass change, MR vs venogram                                SBIRT 22yo+      Most Recent Value   SBIRT (22 yo +)   In order to provide better care to our patients, we are screening all of our patients for alcohol and drug use  Would it be okay to ask you these screening questions? Yes Filed at: 03/27/2021 3590   Initial Alcohol Screen: US AUDIT-C    1  How often do you have a drink containing alcohol?  0 Filed at: 03/27/2021 0815   2  How many drinks containing alcohol do you have on a typical day you are drinking? 0 Filed at: 03/27/2021 0815   3a  Male UNDER 65: How often do you have five or more drinks on one occasion? 0 Filed at: 03/27/2021 0815   3b  FEMALE Any Age, or MALE 65+:  How often do you have 4 or more drinks on one occassion? 0 Filed at: 03/27/2021 0815   Audit-C Score  0 Filed at: 03/27/2021 1564   KIRILL: How many times in the past year have you    Used an illegal drug or used a prescription medication for non-medical reasons? Never Filed at: 03/27/2021 1245                    MDM    Disposition  Final diagnoses:   Hypoxia   [facility]est[facility] failure) [facility]     Time reflects when diagnosis was documented in both MDM as applicable and the Disposition within this note     Time User Action Codes Description Comment    3/27/2021  9:36 AM Suzanne Martinez Add [R09 02] Hypoxia     3/27/2021  9:36 AM Marylene Boor, Joseph Goodman Add [I50 9] CHF (congestive heart failure) ([facility] )     3/27/2021  2:48 PM Perry Ramirez Add [E87 1] Hyponatremia       ED Disposition     ED Disposition Condition Date/Time Comment    Admit Stable Sat Mar 27, 2021  9:37 AM         Follow-up Information    None         Current Discharge Medication List      CONTINUE these medications which have NOT CHANGED    Details   allopurinol (ZYLOPRIM) 100 mg tablet TAKE TWO TABLETS BY MOUTH DAILY      amLODIPine (NORVASC) 5 mg tablet Take 5 mg by mouth daily      atenolol (TENORMIN) 25 mg tablet Take by mouth      benazepril (LOTENSIN) 10 mg tablet TAKE ONE TABLET BY MOUTH DAILY  brinzolamide (Azopt) 1 % ophthalmic suspension       fluticasone (FLONASE) 50 mcg/act nasal spray       furosemide (LASIX) 40 mg tablet TAKE ONE TABLET BY MOUTH TWICE DAILY 6 hours apart      meclizine (ANTIVERT) 25 mg tablet Take by mouth      metolazone (ZAROXOLYN) 5 mg tablet Take 5 mg by mouth daily      pravastatin (PRAVACHOL) 40 mg tablet TAKE ONE TABLET BY MOUTH DAILY  rivaroxaban (Xarelto) 15 mg tablet TAKE ONE TABLET BY MOUTH DAILY  No discharge procedures on file      PDMP Review     None          ED Provider  Electronically Signed by           Edwin Tavares DO  03/27/21 2901

## 2021-03-27 NOTE — ASSESSMENT & PLAN NOTE
Patient has acute respiratory failure with hypoxia  Currently requiring 2 L of oxygen to maintain pulse ox of more than 92%  Does not use any oxygen at home  Acute respiratory failure is probably secondary to atelectasis and obesity hypoventilation syndrome    No evidence of large effusions or pneumonia noted on CT chest   Unlikely pulmonary embolism as patient is already on anticoagulation

## 2021-03-27 NOTE — ASSESSMENT & PLAN NOTE
Blood pressure is currently controlled  Will continue Norvas and erica Samuel    Hold Lasix, metolazone, Lotensin for now

## 2021-03-27 NOTE — ASSESSMENT & PLAN NOTE
Patient appears to have hypovolemic hyponatremia at this time  She was recently increased on her diuretics and metolazone was added  She appears to have inadequate oral intake  Will check urine osmolarity, serum osmolarity and serum sodium  Will place on IV fluid hydration for now and recheck BMP q 12 for now  Correct serum sodium by 8 mEq 24 hours  Consult placed to Nephrology  Check TSH  CT chest does not show any evidence of malignancy

## 2021-03-27 NOTE — ASSESSMENT & PLAN NOTE
Currently rate controlled  Continue to atenolol for rate control and Xarelto for anticoagulation  CTA chest shows katheryn cava    No need for venous Dopplers to be done as patient is already on anticoagulation

## 2021-03-28 LAB
ANION GAP SERPL CALCULATED.3IONS-SCNC: 2 MMOL/L (ref 4–13)
ANION GAP SERPL CALCULATED.3IONS-SCNC: 3 MMOL/L (ref 4–13)
BUN SERPL-MCNC: 13 MG/DL (ref 5–25)
BUN SERPL-MCNC: 15 MG/DL (ref 5–25)
CALCIUM SERPL-MCNC: 8.6 MG/DL (ref 8.3–10.1)
CALCIUM SERPL-MCNC: 9.1 MG/DL (ref 8.3–10.1)
CHLORIDE SERPL-SCNC: 91 MMOL/L (ref 100–108)
CHLORIDE SERPL-SCNC: 93 MMOL/L (ref 100–108)
CO2 SERPL-SCNC: 33 MMOL/L (ref 21–32)
CO2 SERPL-SCNC: 36 MMOL/L (ref 21–32)
CREAT SERPL-MCNC: 0.82 MG/DL (ref 0.6–1.3)
CREAT SERPL-MCNC: 0.89 MG/DL (ref 0.6–1.3)
ERYTHROCYTE [DISTWIDTH] IN BLOOD BY AUTOMATED COUNT: 14.8 % (ref 11.6–15.1)
GFR SERPL CREATININE-BSD FRML MDRD: 58 ML/MIN/1.73SQ M
GFR SERPL CREATININE-BSD FRML MDRD: 65 ML/MIN/1.73SQ M
GLUCOSE SERPL-MCNC: 90 MG/DL (ref 65–140)
GLUCOSE SERPL-MCNC: 99 MG/DL (ref 65–140)
HCT VFR BLD AUTO: 33.6 % (ref 34.8–46.1)
HGB BLD-MCNC: 11 G/DL (ref 11.5–15.4)
MCH RBC QN AUTO: 31.6 PG (ref 26.8–34.3)
MCHC RBC AUTO-ENTMCNC: 32.7 G/DL (ref 31.4–37.4)
MCV RBC AUTO: 97 FL (ref 82–98)
OSMOLALITY UR: 380 MMOL/KG
PLATELET # BLD AUTO: 55 THOUSANDS/UL (ref 149–390)
PMV BLD AUTO: 10.8 FL (ref 8.9–12.7)
POTASSIUM SERPL-SCNC: 3.5 MMOL/L (ref 3.5–5.3)
POTASSIUM SERPL-SCNC: 3.6 MMOL/L (ref 3.5–5.3)
RBC # BLD AUTO: 3.48 MILLION/UL (ref 3.81–5.12)
SODIUM 24H UR-SCNC: 50 MOL/L
SODIUM SERPL-SCNC: 129 MMOL/L (ref 136–145)
SODIUM SERPL-SCNC: 129 MMOL/L (ref 136–145)
TSH SERPL DL<=0.05 MIU/L-ACNC: 1.32 UIU/ML (ref 0.36–3.74)
WBC # BLD AUTO: 3.02 THOUSAND/UL (ref 4.31–10.16)

## 2021-03-28 PROCEDURE — 99232 SBSQ HOSP IP/OBS MODERATE 35: CPT | Performed by: FAMILY MEDICINE

## 2021-03-28 PROCEDURE — 99222 1ST HOSP IP/OBS MODERATE 55: CPT | Performed by: INTERNAL MEDICINE

## 2021-03-28 PROCEDURE — 80048 BASIC METABOLIC PNL TOTAL CA: CPT | Performed by: FAMILY MEDICINE

## 2021-03-28 PROCEDURE — 84443 ASSAY THYROID STIM HORMONE: CPT | Performed by: FAMILY MEDICINE

## 2021-03-28 PROCEDURE — 85027 COMPLETE CBC AUTOMATED: CPT | Performed by: FAMILY MEDICINE

## 2021-03-28 RX ORDER — FUROSEMIDE 10 MG/ML
40 INJECTION INTRAMUSCULAR; INTRAVENOUS ONCE
Status: COMPLETED | OUTPATIENT
Start: 2021-03-28 | End: 2021-03-28

## 2021-03-28 RX ADMIN — RIVAROXABAN 20 MG: 20 TABLET, FILM COATED ORAL at 07:50

## 2021-03-28 RX ADMIN — SODIUM CHLORIDE 125 ML/HR: 0.9 INJECTION, SOLUTION INTRAVENOUS at 06:05

## 2021-03-28 RX ADMIN — AMLODIPINE BESYLATE 5 MG: 5 TABLET ORAL at 07:50

## 2021-03-28 RX ADMIN — ALLOPURINOL 200 MG: 100 TABLET ORAL at 07:50

## 2021-03-28 RX ADMIN — PRAVASTATIN SODIUM 40 MG: 40 TABLET ORAL at 07:50

## 2021-03-28 RX ADMIN — ATENOLOL 25 MG: 25 TABLET ORAL at 07:50

## 2021-03-28 RX ADMIN — FUROSEMIDE 40 MG: 10 INJECTION, SOLUTION INTRAMUSCULAR; INTRAVENOUS at 09:56

## 2021-03-28 NOTE — PROGRESS NOTES
[address]  Progress Note Mirian Zhang 1933, 80 y o  female MRN: [id number]  Unit/Bed#: -01 Encounter: [id number]  Primary Care Provider: Andres Lane DO   Date and time admitted to hospital: 3/27/2021  8:02 AM    * Acute respiratory failure with hypoxia [facility])  Assessment & Plan  Patient has acute respiratory failure with hypoxia  Currently requiring 2 L of oxygen to maintain pulse ox of more than 92%  Does not use any oxygen at home  Acute respiratory failure is probably secondary to atelectasis and obesity hypoventilation syndrome  No evidence of large effusions or pneumonia noted on CT chest   Unlikely pulmonary embolism as patient is already on anticoagulation    Hyponatremia  Assessment & Plan  Patient appears to have hypovolemic hyponatremia at this time  She was recently increased on her diuretics and metolazone was added  She appears to have inadequate oral intake  Will check urine osmolarity, serum osmolarity and serum sodium  Sodium improved to 129 this morning  Discontinue IV fluids  Received 1 dose of IV Lasix  recheck BMP q 12 for now  Correct serum sodium by 8 mEq 24 hours  Baseline sodium is 135-138  Appreciate input from Nephrology  Normal TSH  CT chest does not show any evidence of malignancy  Other secondary gout, multiple sites  Assessment & Plan  Stable at this time  Not in exacerbation  Continue allopurinol    Paroxysmal A-fib (HCC)  Assessment & Plan  Currently rate controlled  Continue to atenolol for rate control and Xarelto for anticoagulation  CTA chest shows katheryn cava  No need for venous Dopplers to be done as patient is already on anticoagulation    Mixed hyperlipidemia  Assessment & Plan  Continue statin therapy    Essential hypertension  Assessment & Plan  Blood pressure is currently controlled  Will continue Norvasc and erica Ortiz Cancel    Hold Lasix, metolazone, Lotensin for now    Morbid obesity ([facility] [address] )  Assessment & Plan  Will need counseling on diet exercise and lifestyle modification  BMI is 36 15      VTE Pharmacologic Prophylaxis:   Pharmacologic: Rivaroxaban (Xarelto)  Mechanical VTE Prophylaxis in Place: Yes    Patient Centered Rounds: I have performed bedside rounds with nursing staff today  Discussions with Specialists or Other Care Team Provider:  Discussed with Nephrology    Education and Discussions with Family / Patient:  Discussed with patient at bedside    Time Spent for Care: 45 minutes  More than 50% of total time spent on counseling and coordination of care as described above  Current Length of Stay: 1 day(s)    Current Patient Status: Inpatient   Certification Statement: The patient will continue to require additional inpatient hospital stay due to Acute hyponatremia    Discharge Plan:  Pending progress  Monitor BMP q 12    Code Status: Level 1 - Full Code      Subjective:   Patient denies any chest pain but does feel little short of breath today  Denies any nausea vomiting or diarrhea  Objective:     Vitals:   Temp (24hrs), Av 3 °F (36 8 °C), Min:98 2 °F (36 8 °C), Max:98 4 °F (36 9 °C)    Temp:  [98 2 °F (36 8 °C)-98 4 °F (36 9 °C)] 98 2 °F (36 8 °C)  HR:  [48-63] 63  Resp:  [18-20] 20  BP: (109-129)/(55-67) 129/55  SpO2:  [92 %-95 %] 95 %  Body mass index is 36 15 kg/m²  Input and Output Summary (last 24 hours): Intake/Output Summary (Last 24 hours) at 3/28/2021 1310  Last data filed at 3/28/2021 0901  Gross per 24 hour   Intake 2482 92 ml   Output 1750 ml   Net 732 92 ml       Physical Exam:     Physical Exam  Vitals signs and nursing note reviewed  Constitutional:       Appearance: Normal appearance  HENT:      Head: Normocephalic and atraumatic  Right Ear: External ear normal       Left Ear: External ear normal       Nose: Nose normal       Mouth/Throat:      Pharynx: Oropharynx is clear  Eyes:      Pupils: Pupils are equal, round, and reactive to light     Neck: Musculoskeletal: Normal range of motion and neck supple  Cardiovascular:      Rate and Rhythm: Normal rate and regular rhythm  Heart sounds: Normal heart sounds  Pulmonary:      Effort: Pulmonary effort is normal       Comments: Moderate air entry bilaterally with diminished breath sounds bilateral bases  Abdominal:      General: Bowel sounds are normal       Palpations: Abdomen is soft  Tenderness: There is no abdominal tenderness  Musculoskeletal: Normal range of motion  Right lower leg: Edema present  Left lower leg: Edema present  Skin:     General: Skin is warm and dry  Capillary Refill: Capillary refill takes less than 2 seconds  Neurological:      General: No focal deficit present  Mental Status: She is alert and oriented to person, place, and time  Psychiatric:         Mood and Affect: Mood normal            Additional Data:     Labs:    Results from last 7 days   Lab Units 03/28/21  0458 03/27/21  0825   WBC Thousand/uL 3 02* 3 41*   HEMOGLOBIN g/dL 11 0* 12 3   HEMATOCRIT % 33 6* 36 8   PLATELETS Thousands/uL 55* 60*   NEUTROS PCT %  --  76*   LYMPHS PCT %  --  14   MONOS PCT %  --  9   EOS PCT %  --  1     Results from last 7 days   Lab Units 03/28/21 0458  03/27/21  0825   SODIUM mmol/L 129*   < > 125*   POTASSIUM mmol/L 3 6   < > 3 2*   CHLORIDE mmol/L 93*   < > 88*   CO2 mmol/L 33*   < > 34*   BUN mg/dL 13   < > 16   CREATININE mg/dL 0 89   < > 1 07   ANION GAP mmol/L 3*   < > 3*   CALCIUM mg/dL 8 6   < > 9 6   ALBUMIN g/dL  --   --  3 9   TOTAL BILIRUBIN mg/dL  --   --  1 48*   ALK PHOS U/L  --   --  61   ALT U/L  --   --  27   AST U/L  --   --  37   GLUCOSE RANDOM mg/dL 90   < > 124    < > = values in this interval not displayed  Results from last 7 days   Lab Units 03/27/21  0825   LACTIC ACID mmol/L 2 0           * I Have Reviewed All Lab Data Listed Above  * Additional Pertinent Lab Tests Reviewed:  Chip 66 Admission Reviewed    Imaging:    Imaging Reports Reviewed Today Include:  None  Imaging Personally Reviewed by Myself Includes:  None    Recent Cultures (last 7 days):           Last 24 Hours Medication List:   Current Facility-Administered Medications   Medication Dose Route Frequency Provider Last Rate    acetaminophen  650 mg Oral Q6H PRN Harlan Calloway MD      allopurinol  200 mg Oral Daily Harlan Calloway MD      amLODIPine  5 mg Oral Daily Harlan Calloawy MD      atenolol  25 mg Oral Daily Harlan Calloway MD      brinzolamide  1 drop Both Eyes TID Harlan Calloway MD      calcium carbonate  1,000 mg Oral Daily PRN Harlan Calloway MD      fluticasone  1 spray Each Nare Daily Harlan Calloway MD      ondansetron  4 mg Intravenous Q6H PRN Harlan Calloway MD      pravastatin  40 mg Oral Daily Harlan Calloway MD      rivaroxaban  20 mg Oral Daily Harlan Calloway MD          Today, Patient Was Seen By: Harlan Calloway MD    ** Please Note: Dictation voice to text software may have been used in the creation of this document   **

## 2021-03-28 NOTE — CONSULTS
Consultation - Nephrology   Saul Quinteros 80 y o  female MRN: [id number]  Unit/Bed#: -01 Encounter: [id number]      A/P:  1  Hypervolemic hyponatremia   Agree with re-initiation of diuresis, patient was likely over drinking water, she tends to drink a lot of Gatorade at home  Patient will likely do better drinking water, not Gatorade which has sodium, and be on a fluid restriction  2  Hypokalemia   Likely brought on by the metolazone, she is on status post supplementation with potassium and potassium was appropriate  3  Bilateral lower extremity edema   Patient to be re-initiated on diuretics which I agree with  She is typically on 40 mg oral furosemide, I will give her 40 mg of IV furosemide this morning, and see how she does  4  Acute respiratory failure   Patient continues to require oxygen via nasal cannula, this should improve as we reinitiate diuresis  Thank you for allowing us to participate in the care of your patient  Please feel free to contact us regarding the care of this patient, or any other questions/concerns that may be applicable  Patient Active Problem List   Diagnosis    Acute respiratory failure with hypoxia (HCC)    Hyponatremia    Morbid obesity ([address] )    Essential hypertension    Mixed hyperlipidemia    Paroxysmal A-fib (HCC)    Other secondary gout, multiple sites       History of Present Illness   Physician Requesting Consult: Greg Robertson MD  Reason for Consult / Principal Problem:  Hyponatremia  Hx and PE limited by:   HPI: Saul Quinteros is a 80y o  year old female who presented to the emergency department on March 27th with complaints of shortness of breath  Patient has a history of congestive heart failure is typically on Lasix, which she has been taking on regular basis  Patient inform her primary care provider about this shortness of breath and metolazone was added top of her typical furosemide regimen    Patient was eating and drinking her normal fluid with the and also the order that she is aware  Patient's typical oral intake of food is likely moderately elevated sodium  Patient was ultimately admitted for hypoxia which required oxygen via nasal cannula, and was also noted to have hyponatremia  Patient's serum sodium at presentation was 125 millimole/L, repeat was noted to be at 124 millimole/L this morning's improving up to 129 millimole/L  At home , the patient drinks a lot of Gatorade, in addition to the water that she drinks on regular basis  Patient was placed on normal saline in the interim which helped to improve her level up to the 129 millimole/L  There are no other significant electrolyte abnormalities within a mild hypokalemia presentation  This morning's patient's potassium is 3 6 millimole/L  History obtained from chart review and the patient    Review of Systems - Negative except as mentioned above in HPI, more specifics as mentioned below    Review of Systems - General ROS: positive for  - fatigue  Psychological ROS: negative  Ophthalmic ROS: negative  ENT ROS: negative  Allergy and Immunology ROS: negative  Hematological and Lymphatic ROS: negative  Endocrine ROS: negative  Respiratory ROS: positive for - shortness of breath  Cardiovascular ROS:  Increased lower extremity edema  Gastrointestinal ROS: no abdominal pain, change in bowel habits, or black or bloody stools  Genito-Urinary ROS: no dysuria, trouble voiding, or hematuria  Musculoskeletal ROS: negative  Neurological ROS: no TIA or stroke symptoms  Dermatological ROS: negative    Historical Information   Past Medical History:   Diagnosis Date    [facility] )     Coronary artery disease     Dizziness     Edema     GERD (gastroesophageal reflux disease)     High cholesterol     Hypertension      Past Surgical History:   Procedure Laterality Date    CHOLECYSTECTOMY OPEN       Social History   Social History     Substance and Sexual Activity   Alcohol Use Not Currently Social History     Substance and Sexual Activity   Drug Use Not Currently     Social History     Tobacco Use   Smoking Status Never Smoker   Smokeless Tobacco Never Used     Family History   Problem Relation Age of Onset    Hypertension Father        Meds/Allergies   all current active meds have been reviewed, current meds:   Current Facility-Administered Medications   Medication Dose Route Frequency    acetaminophen (TYLENOL) tablet 650 mg  650 mg Oral Q6H PRN    allopurinol (ZYLOPRIM) tablet 200 mg  200 mg Oral Daily    amLODIPine (NORVASC) tablet 5 mg  5 mg Oral Daily    atenolol (TENORMIN) tablet 25 mg  25 mg Oral Daily    brinzolamide (AZOPT) 1 % ophthalmic suspension 1 drop  1 drop Both Eyes TID    calcium carbonate (TUMS) chewable tablet 1,000 mg  1,000 mg Oral Daily PRN    fluticasone (FLONASE) 50 mcg/act nasal spray 1 spray  1 spray Each Nare Daily    ondansetron (ZOFRAN) injection 4 mg  4 mg Intravenous Q6H PRN    pravastatin (PRAVACHOL) tablet 40 mg  40 mg Oral Daily    rivaroxaban (XARELTO) tablet 20 mg  20 mg Oral Daily    and PTA meds:    Medications Prior to Admission   Medication    allopurinol (ZYLOPRIM) 100 mg tablet    amLODIPine (NORVASC) 5 mg tablet    atenolol (TENORMIN) 25 mg tablet    benazepril (LOTENSIN) 10 mg tablet    brinzolamide (Azopt) 1 % ophthalmic suspension    fluticasone (FLONASE) 50 mcg/act nasal spray    furosemide (LASIX) 40 mg tablet    meclizine (ANTIVERT) 25 mg tablet    metolazone (ZAROXOLYN) 5 mg tablet    pravastatin (PRAVACHOL) 40 mg tablet    rivaroxaban (Xarelto) 15 mg tablet         No Known Allergies    Objective     Intake/Output Summary (Last 24 hours) at 3/28/2021 0908  Last data filed at 3/28/2021 0801  Gross per 24 hour   Intake 2482 92 ml   Output 1800 ml   Net 682 92 ml       Invasive Devices:        Physical Exam      I/O last 3 completed shifts: In: 2162 9 [P O :240;  I V :1922 9]  Out: 1800 [Urine:1800]    Vitals:    03/28/21 0743 BP: 129/55   Pulse: 63   Resp: 20   Temp: 98 2 °F (36 8 °C)   SpO2: 95%       Gen: in NAD, Alert/Awake  HEENT: no sclerous icterus, MMM, neck supple  CV: +S1/S2, RRR  Lungs:  Reduced with anterior auscultation  Abd: +BS, soft NT/ND  Ext: all four extremities are warm, mild bilateral lower extremity edema  Skin: no rashes noted  Neuro: CN II-XII intact    Current Weight: Weight - Scale: 102 kg (224 lb)  First Weight: Weight - Scale: 102 kg (224 lb)    Lab Results:  I have personally reviewed pertinent labs  CBC:   Lab Results   Component Value Date    WBC 3 02 (L) 03/28/2021    HGB 11 0 (L) 03/28/2021    HCT 33 6 (L) 03/28/2021    MCV 97 03/28/2021    PLT 55 (L) 03/28/2021    MCH 31 6 03/28/2021    MCHC 32 7 03/28/2021    RDW 14 8 03/28/2021    MPV 10 8 03/28/2021     CMP:   Lab Results   Component Value Date    K 3 6 03/28/2021    CL 93 (L) 03/28/2021    CO2 33 (H) 03/28/2021    BUN 13 03/28/2021    CREATININE 0 89 03/28/2021    CALCIUM 8 6 03/28/2021    EGFR 58 03/28/2021     Phosphorus: No results found for: PHOS  Magnesium: No results found for: MG  Urinalysis: No results found for: COLORU, CLARITYU, SPECGRAV, PHUR, LEUKOCYTESUR, NITRITE, PROTEINUA, GLUCOSEU, KETONESU, BILIRUBINUR, BLOODU  Ionized Calcium: No results found for: CAION  Coagulation: No results found for: PT, INR, APTT  Troponin: No results found for: TROPONINI  ABG: No results found for: PHART, XFW8WUX, PO2ART, XGP2OFH, J8VQZLAM, BEART, SOURCE    Results from last 7 days   Lab Units 03/28/21  0458 03/27/21  1823 03/27/21  0825   POTASSIUM mmol/L 3 6 3 7 3 2*   CHLORIDE mmol/L 93* 88* 88*   CO2 mmol/L 33* 32 34*   BUN mg/dL 13 17 16   CREATININE mg/dL 0 89 1 04 1 07   CALCIUM mg/dL 8 6 8 8 9 6   ALK PHOS U/L  --   --  61   ALT U/L  --   --  27   AST U/L  --   --  37       Radiology review:  Procedure: Xr Chest Portable    Result Date: 3/27/2021  Narrative: CHEST INDICATION:  Cough   COMPARISON:  None EXAM PERFORMED/VIEWS:  XR CHEST PORTABLE FINDINGS: Cardiac silhouette is top normal to borderline enlarged  Patient is rotated to the left  Central vascular prominence  No focal infiltrates  No pleural effusions or pneumothorax  Severe osteoarthritis bilateral glenohumeral joints  Impression: Central vascular congestion  No focal infiltrates  Workstation performed: [id number]     Procedure: Cta Abdominal W Run Off W Wo Contrast    Result Date: 3/27/2021  Narrative: CT ANGIOGRAM OF THE CHEST ABDOMEN AND LOWER EXTREMITIES WITH IV CONTRAST INDICATION:  Abdominal pain and left lower extremity pain COMPARISON: None are available at this institution  TECHNIQUE:  CT angiogram examination of the abdomen, pelvis, and lower extremities was performed according to standard protocol with intravenous contrast   This examination, like all CT scans performed in the [facility], was performed utilizing techniques to minimize radiation dose exposure, including the use of iterative reconstruction and automated exposure control  3D reconstructions were performed an independent workstation, and are supplied for review  Rad dose 3540 mGy-cm IV Contrast:  130 mL of iohexol (OMNIPAQUE)  FINDINGS: VASCULAR STRUCTURES:   Within the chest, the aorta has diffuse atherosclerotic disease present  There is no aneurysm present  Standard branching anatomy of the great vessels is noted without origin stenosis  The descending thoracic aorta is tortuous, but  normal in size  Below the diaphragm, all 3 mesenteric vessels are widely patent  Single renal arteries are present bilaterally without stenosis  There is diffuse calcification involving the infrarenal aorta and extending into the iliac vessels  No significant stenosis is present  Examination of the right lower extremity demonstrates the common femoral artery, profunda femoris artery, superficial femoral artery, popliteal artery, three-vessel runoff is patent   There are some calcifications present within the popliteal artery, but no significant stenosis  Venous filling is noted with superficial varicosities present, and there is significant edema below the knee  Examination of the left lower extremity demonstrates the common femoral artery, profunda femoris artery, superficial femoral artery, and popliteal artery, are patent  There are some calcifications present within the popliteal artery, but no significant stenosis  The posterior tibial and peroneal arteries are patent, but there is a large calcific plaque in the proximal anterior tibial artery resulting in a high-grade chronic stenosis  Venous filling is noted with superficial varicosities present, and there is significant edema below the knee  There is no significant venous filling above the level of the knees due to the technique used for the CTA study, however, it is noted that the inferior vena cava is a "megacava", measuring 4 cm at the inferior renal veins, and 3 5 cm just below the level  of the renal veins, closer to the iliac confluence it measures approximately 3 cm  Since there is no significant venous enhancement in the inferior vena cava, it is not possible to know if an intravascular mass or other lesion is present producing this enlargement, or if this is just an usually large IVC  Further imaging will be needed to exclude an intravascular obstructing or partially obstructing lesion  There is no evidence of pulmonary embolism  OTHER FINDINGS HEART:  The heart is enlarged, particularly the atria  No pericardial effusions  LUNGS: There is significant motion artifact secondary to breathing, limiting evaluation of the lungs, however, there is no gross abnormality  PLEURA:  A trace right pleural effusion is present    MEDIASTINUM AND HARSH:  Mediastinal lymph nodes are present which are upper limits of normal  CHEST WALL AND LOWER NECK: Normal  ABDOMEN LIVER/BILIARY TREE:  Unremarkable  GALLBLADDER:  Gallbladder surgically absent  SPLEEN:  Unremarkable  Normal size  PANCREAS:  Unremarkable  ADRENAL GLANDS: Unremarkable  KIDNEYS/URETERS:  No solid renal mass  No hydronephrosis  No urinary tract calculi  PELVIS REPRODUCTIVE ORGANS:  Not visualized  URINARY BLADDER:  Unremarkable  ADDITIONAL ABDOMINAL AND PELVIC STRUCTURES STOMACH AND BOWEL:  Unremarkable  ABDOMINOPELVIC CAVITY:   No pathologically enlarged mesenteric or retroperitoneal lymph nodes  No ascites or free intraperitoneal air  ABDOMINAL WALL/INGUINAL REGIONS:  Unremarkable  OSSEOUS STRUCTURES:  No acute fracture or destructive osseous lesion  Significant degenerative changes are noted in both shoulders and throughout the spine with an S-curve scoliosis present  Impression: 1  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 as described above  An intravascular lesion or mass cannot be excluded based on this study alone  Additional imaging is recommended such as an MRI or potentially a CT venogram  2  Diffuse atherosclerotic disease as described above with no significant lesions as described above  3  Soft tissue edema loaded below the knees bilaterally with superficial varicosities present  4  Cardiac enlargement  5  Trace right pleural effusion  6  DJD  The significant findings were discussed with Dr Sofia Coates directly by telephone  Workstation performed: Cecilia Pickett DO      This consultation note was produced in part using a dictation device which may document imprecise wording from author's original intent

## 2021-03-28 NOTE — UTILIZATION REVIEW
Initial Clinical Review    Admission: Date/Time/Statement:   Admission Orders (From admission, onward)     Ordered        03/27/21 1211  Inpatient Admission  Once                   Orders Placed This Encounter   Procedures    Inpatient Admission     Standing Status:   Standing     Number of Occurrences:   1     Order Specific Question:   Level of Care     Answer:   Med Surg [16]     Order Specific Question:   Bed request comments     Answer:   telemetry     Order Specific Question:   Estimated length of stay     Answer:   More than 2 Midnights     Order Specific Question:   Certification     Answer:   I certify that inpatient services are medically necessary for this patient for a duration of greater than two midnights  See H&P and MD Progress Notes for additional information about the patient's course of treatment  ED Arrival Information     Expected Arrival Acuity Means of Arrival Escorted By Service Admission Type    - 3/27/2021 08:02 Urgent Ambulance [facility] Hospitalist Urgent    Arrival Complaint    Shortness Of Breath        Chief Complaint   Patient presents with    Shortness of Breath     low pulse ox when EMS arrived and increased leg edema     Assessment/Plan: 79 yo female to ED from home w/ SOB , not feeling well for last few days   C/o nausea and intermittent episodes of diarrhea  In ED found to be hypoxic 89 % placed on 2l   Hyponatremia appears to be hypovolemic , recent inc on her diuretics and metolazone   Admitted Ip status cont IVF , recheck BMP q12hr , monitor O2 sats , consult nephrology , check tsh       3/28 Nephrology Consult   Hypovolemic hyponatremia reinitiate diuretics , was likely over drinking water  Hypokalemia s/p supplement  BLE edema give iv lasix this am and monitor response  Cont to require O2 , should improve w/ diuresis      ED Triage Vitals   Temperature Pulse Respirations Blood Pressure SpO2   03/27/21 0811 03/27/21 0811 03/27/21 1729 03/27/21 0811 03/27/21 0811   98 1 °F (36 7 °C) 64 16 136/62 (!) 89 %      Temp src Heart Rate Source Patient Position - Orthostatic VS BP Location FiO2 (%)   -- 03/27/21 0922 03/27/21 1115 03/27/21 0811 --    Monitor Lying Left arm       Pain Score       03/27/21 0920       3          Wt Readings from Last 1 Encounters:   03/27/21 102 kg (224 lb)     Additional Vital Signs:   03/28/21 0800  --  --  --  --  --  --  28  2 L/min  Nasal cannula  --   03/28/21 07:43:49  98 2 °F (36 8 °C)  63  20  129/55  80  95 %  28  2 L/min  Nasal cannula  --   03/27/21 23:49:08  98 4 °F (36 9 °C)  57  20  128/55  79  92 %  --  --  --  --   03/27/21 2000  --  --  --  --  --  --  28  2 L/min  Nasal cannula  --   03/27/21 17:13:39  98 2 °F (36 8 °C)  48Abnormal   18  109/67  81  94 %  --  --  --  --   03/27/21 1500  --  --  --  --  --  --  28  2 L/min  Nasal cannula  --   03/27/21 12:48:32  98 °F (36 7 °C)  58  16  138/55  83  92 %  --  --  --  --   03/27/21 1200  --  --  --  --  --  --  32  3 L/min  Nasal cannula  --   03/27/21 1115  --  58  20  128/61  88  97 %  --  --  --  Lying   03/27/21 1100  --  59  20  145/67  96  96 %  28  2 L/min  Nasal cannula  --   03/27/21 1051  98 4 °F (36 9 °C)  67  16  186/74Abnormal   --  98 %  28  2 L/min  Nasal cannula  --   03/27/21 0922  98 °F (36 7 °C)  65  16  132/78  --  96 %               Pertinent Labs/Diagnostic Test Results:   3/27 PCXR Central vascular congestion    No focal infiltrates      3/27 CTA abd 1  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 as described above  An intravascular lesion or mass cannot be excluded based on this study alone  Additional imaging is recommended such as an MRI or potentially a CT venogram   2  Diffuse atherosclerotic disease as described above with no significant lesions as described above  3  Soft tissue edema loaded below the knees bilaterally with superficial varicosities present  4  Cardiac enlargement  5  Trace right pleural effusion    6  DJD     Results from last 7 days   Lab Units 03/27/21  0825   SARS-COV-2  Negative     Results from last 7 days   Lab Units 03/28/21  0458 03/27/21  0825   WBC Thousand/uL 3 02* 3 41*   HEMOGLOBIN g/dL 11 0* 12 3   HEMATOCRIT % 33 6* 36 8   PLATELETS Thousands/uL 55* 60*   NEUTROS ABS Thousands/µL  --  2 58     Results from last 7 days   Lab Units 03/28/21  0458 03/27/21  1823 03/27/21  0825   SODIUM mmol/L 129* 124* 125*   POTASSIUM mmol/L 3 6 3 7 3 2*   CHLORIDE mmol/L 93* 88* 88*   CO2 mmol/L 33* 32 34*   ANION GAP mmol/L 3* 4 3*   BUN mg/dL 13 17 16   CREATININE mg/dL 0 89 1 04 1 07   EGFR ml/min/1 73sq m 58 48 47   CALCIUM mg/dL 8 6 8 8 9 6     Results from last 7 days   Lab Units 03/27/21  0825   AST U/L 37   ALT U/L 27   ALK PHOS U/L 61   TOTAL PROTEIN g/dL 7 9   ALBUMIN g/dL 3 9   TOTAL BILIRUBIN mg/dL 1 48*     Results from last 7 days   Lab Units 03/28/21  0458 03/27/21  1823 03/27/21  0825   GLUCOSE RANDOM mg/dL 90 135 124     Results from last 7 days   Lab Units 03/27/21  0825   OSMOLALITY, SERUM mmol/*     Results from last 7 days   Lab Units 03/27/21  0825   TROPONIN I ng/mL <0 02     Results from last 7 days   Lab Units 03/27/21  0825   D-DIMER QUANTITATIVE ug/ml FEU 0 77*     Results from last 7 days   Lab Units 03/28/21  0458   TSH 3RD GENERATON uIU/mL 1 321     Results from last 7 days   Lab Units 03/27/21  0825   LACTIC ACID mmol/L 2 0     Results from last 7 days   Lab Units 03/27/21  0825   NT-PRO BNP pg/mL 3,204*     Results from last 7 days   Lab Units 03/27/21  0825   CRP mg/L 3 0*     Results from last 7 days   Lab Units 03/27/21  0859   CLARITY UA  Clear   COLOR UA  Yellow   SPEC GRAV UA  1 010   PH UA  7 5   GLUCOSE UA mg/dl Negative   KETONES UA mg/dl Negative   BLOOD UA  Trace-lysed*   PROTEIN UA mg/dl Trace*   NITRITE UA  Negative   BILIRUBIN UA  Negative   UROBILINOGEN UA E U /dl 0 2   LEUKOCYTES UA  Negative   WBC UA /hpf 0-1   RBC UA /hpf 2-4   BACTERIA UA /hpf Occasional EPITHELIAL CELLS WET PREP /hpf Occasional     Results from last 7 days   Lab Units 03/27/21  0825   INFLUENZA A PCR  Negative   INFLUENZA B PCR  Negative   RSV PCR  Negative       ED Treatment:   Medication Administration from 03/27/2021 0802 to 03/27/2021 1241       Date/Time Dose Route Action     03/27/2021 0928 130 mL Intravenous Given     03/27/2021 0959 40 mEq Oral Given     03/27/2021 0959 40 mg Intravenous Given        Past Medical History:   Diagnosis Date    A-fib [facility])     Coronary artery disease     Dizziness     Edema     GERD (gastroesophageal reflux disease)     High cholesterol     Hypertension      Present on Admission:  **None**      Admitting Diagnosis: Shortness of breath [R06 02]  CHF (congestive heart failure) (HCC) [I50 9]  Hypoxia [R09 02]  Age/Sex: 80 y o  female  Admission Orders:  Scheduled Medications:  allopurinol, 200 mg, Oral, Daily  amLODIPine, 5 mg, Oral, Daily  atenolol, 25 mg, Oral, Daily  brinzolamide, 1 drop, Both Eyes, TID  fluticasone, 1 spray, Each Nare, Daily  pravastatin, 40 mg, Oral, Daily  rivaroxaban, 20 mg, Oral, Daily      Continuous IV Infusions:     PRN Meds:  acetaminophen, 650 mg, Oral, Q6H PRN  calcium carbonate, 1,000 mg, Oral, Daily PRN  ondansetron, 4 mg, Intravenous, Q6H PRN    I&O   Daily weight   OT PT eval   Tele     IP CONSULT TO NEPHROLOGY    Network Utilization Review Department  ATTENTION: Please call with any questions or concerns to [phone] and carefully listen to the prompts so that you are directed to the right person  All voicemails are confidential   Bhavna Bee all requests for admission clinical reviews, approved or denied determinations and any other requests to dedicated fax number below belonging to the campus where the patient is receiving treatment   List of dedicated fax numbers for the Facilities:  [address] [place] [address] DENIALS (Administrative/Medical Necessity) [phone]   [address] (Maternity/NICU/Pediatrics) 261 [facility],7Th Floor [facility] [address] [place] [address]  [phone]   [address] ([facility]) [address] [address] [place] [address] [address] [phone]   [place] [address] [phone]

## 2021-03-28 NOTE — PLAN OF CARE
Problem: Potential for Falls  Goal: Patient will remain free of falls  Description: INTERVENTIONS:  - Assess patient frequently for physical needs  -  Identify cognitive and physical deficits and behaviors that affect risk of falls    -  Endeavor fall precautions as indicated by assessment   - Educate patient/family on patient safety including physical limitations  - Instruct patient to call for assistance with activity based on assessment  - Modify environment to reduce risk of injury  - Consider OT/PT consult to assist with strengthening/mobility  Outcome: Progressing     Problem: PAIN - ADULT  Goal: Verbalizes/displays adequate comfort level or baseline comfort level  Description: Interventions:  - Encourage patient to monitor pain and request assistance  - Assess pain using appropriate pain scale  - Administer analgesics based on type and severity of pain and evaluate response  - Implement non-pharmacological measures as appropriate and evaluate response  - Consider cultural and social influences on pain and pain management  - Notify physician/advanced practitioner if interventions unsuccessful or patient reports new pain  Outcome: Progressing     Problem: INFECTION - ADULT  Goal: Absence or prevention of progression during hospitalization  Description: INTERVENTIONS:  - Assess and monitor for signs and symptoms of infection  - Monitor lab/diagnostic results  - Monitor all insertion sites, i e  indwelling lines, tubes, and drains  - Monitor endotracheal if appropriate and nasal secretions for changes in amount and color  - Endeavor appropriate cooling/warming therapies per order  - Administer medications as ordered  - Instruct and encourage patient and family to use good hand hygiene technique  - Identify and instruct in appropriate isolation precautions for identified infection/condition  Outcome: Progressing  Goal: Absence of fever/infection during neutropenic period  Description: INTERVENTIONS:  - Monitor WBC    Outcome: Progressing     Problem: SAFETY ADULT  Goal: Patient will remain free of falls  Description: INTERVENTIONS:  - Assess patient frequently for physical needs  -  Identify cognitive and physical deficits and behaviors that affect risk of falls    -  Jolo fall precautions as indicated by assessment   - Educate patient/family on patient safety including physical limitations  - Instruct patient to call for assistance with activity based on assessment  - Modify environment to reduce risk of injury  - Consider OT/PT consult to assist with strengthening/mobility  Outcome: Progressing  Goal: Maintain or return to baseline ADL function  Description: INTERVENTIONS:  -  Assess patient's ability to carry out ADLs; assess patient's baseline for ADL function and identify physical deficits which impact ability to perform ADLs (bathing, care of mouth/teeth, toileting, grooming, dressing, etc )  - Assess/evaluate cause of self-care deficits   - Assess range of motion  - Assess patient's mobility; develop plan if impaired  - Assess patient's need for assistive devices and provide as appropriate  - Encourage maximum independence but intervene and supervise when necessary  - Involve family in performance of ADLs  - Assess for home care needs following discharge   - Consider OT consult to assist with ADL evaluation and planning for discharge  - Provide patient education as appropriate  Outcome: Progressing  Goal: Maintain or return mobility status to optimal level  Description: INTERVENTIONS:  - Assess patient's baseline mobility status (ambulation, transfers, stairs, etc )    - Identify cognitive and physical deficits and behaviors that affect mobility  - Identify mobility aids required to assist with transfers and/or ambulation (gait belt, sit-to-stand, lift, walker, cane, etc )  - Jolo fall precautions as indicated by assessment  - Record patient progress and toleration of activity level on Mobility SBAR; progress patient to next Phase/Stage  - Instruct patient to call for assistance with activity based on assessment  - Consider rehabilitation consult to assist with strengthening/weightbearing, etc   Outcome: Progressing     Problem: DISCHARGE PLANNING  Goal: Discharge to home or other facility with appropriate resources  Description: INTERVENTIONS:  - Identify barriers to discharge w/patient and caregiver  - Arrange for needed discharge resources and transportation as appropriate  - Identify discharge learning needs (meds, wound care, etc )  - Arrange for interpretive services to assist at discharge as needed  - Refer to Case Management Department for coordinating discharge planning if the patient needs post-hospital services based on physician/advanced practitioner order or complex needs related to functional status, cognitive ability, or social support system  Outcome: Progressing     Problem: Knowledge Deficit  Goal: Patient/family/caregiver demonstrates understanding of disease process, treatment plan, medications, and discharge instructions  Description: Complete learning assessment and assess knowledge base    Interventions:  - Provide teaching at level of understanding  - Provide teaching via preferred learning methods  Outcome: Progressing

## 2021-03-28 NOTE — ASSESSMENT & PLAN NOTE
Patient appears to have hypovolemic hyponatremia at this time  She was recently increased on her diuretics and metolazone was added  She appears to have inadequate oral intake  Will check urine osmolarity, serum osmolarity and serum sodium  Sodium improved to 129 this morning  Discontinue IV fluids  Received 1 dose of IV Lasix  recheck BMP q 12 for now  Correct serum sodium by 8 mEq 24 hours  Baseline sodium is 135-138  Appreciate input from Nephrology  Normal TSH  CT chest does not show any evidence of malignancy

## 2021-03-29 LAB
ANION GAP SERPL CALCULATED.3IONS-SCNC: 1 MMOL/L (ref 4–13)
ANION GAP SERPL CALCULATED.3IONS-SCNC: 3 MMOL/L (ref 4–13)
ATRIAL RATE: 65 BPM
BUN SERPL-MCNC: 15 MG/DL (ref 5–25)
BUN SERPL-MCNC: 18 MG/DL (ref 5–25)
CALCIUM SERPL-MCNC: 8.7 MG/DL (ref 8.3–10.1)
CALCIUM SERPL-MCNC: 9.1 MG/DL (ref 8.3–10.1)
CHLORIDE SERPL-SCNC: 90 MMOL/L (ref 100–108)
CHLORIDE SERPL-SCNC: 91 MMOL/L (ref 100–108)
CO2 SERPL-SCNC: 35 MMOL/L (ref 21–32)
CO2 SERPL-SCNC: 35 MMOL/L (ref 21–32)
CREAT SERPL-MCNC: 0.88 MG/DL (ref 0.6–1.3)
CREAT SERPL-MCNC: 1.07 MG/DL (ref 0.6–1.3)
GFR SERPL CREATININE-BSD FRML MDRD: 47 ML/MIN/1.73SQ M
GFR SERPL CREATININE-BSD FRML MDRD: 59 ML/MIN/1.73SQ M
GLUCOSE SERPL-MCNC: 130 MG/DL (ref 65–140)
GLUCOSE SERPL-MCNC: 179 MG/DL (ref 65–140)
POTASSIUM SERPL-SCNC: 3.3 MMOL/L (ref 3.5–5.3)
POTASSIUM SERPL-SCNC: 3.5 MMOL/L (ref 3.5–5.3)
QRS AXIS: 137 DEGREES
QRSD INTERVAL: 94 MS
QT INTERVAL: 462 MS
QTC INTERVAL: 472 MS
SODIUM SERPL-SCNC: 127 MMOL/L (ref 136–145)
SODIUM SERPL-SCNC: 128 MMOL/L (ref 136–145)
T WAVE AXIS: 4 DEGREES
VENTRICULAR RATE: 63 BPM

## 2021-03-29 PROCEDURE — 99232 SBSQ HOSP IP/OBS MODERATE 35: CPT | Performed by: FAMILY MEDICINE

## 2021-03-29 PROCEDURE — 82570 ASSAY OF URINE CREATININE: CPT | Performed by: INTERNAL MEDICINE

## 2021-03-29 PROCEDURE — 97116 GAIT TRAINING THERAPY: CPT

## 2021-03-29 PROCEDURE — 97167 OT EVAL HIGH COMPLEX 60 MIN: CPT

## 2021-03-29 PROCEDURE — 99232 SBSQ HOSP IP/OBS MODERATE 35: CPT | Performed by: INTERNAL MEDICINE

## 2021-03-29 PROCEDURE — 97163 PT EVAL HIGH COMPLEX 45 MIN: CPT

## 2021-03-29 PROCEDURE — 84540 ASSAY OF URINE/UREA-N: CPT | Performed by: INTERNAL MEDICINE

## 2021-03-29 PROCEDURE — 94761 N-INVAS EAR/PLS OXIMETRY MLT: CPT

## 2021-03-29 PROCEDURE — 93010 ELECTROCARDIOGRAM REPORT: CPT | Performed by: INTERNAL MEDICINE

## 2021-03-29 PROCEDURE — 80048 BASIC METABOLIC PNL TOTAL CA: CPT | Performed by: FAMILY MEDICINE

## 2021-03-29 RX ORDER — POLYETHYLENE GLYCOL 3350 17 G/17G
17 POWDER, FOR SOLUTION ORAL DAILY PRN
Status: DISCONTINUED | OUTPATIENT
Start: 2021-03-29 | End: 2021-03-31 | Stop reason: HOSPADM

## 2021-03-29 RX ORDER — FUROSEMIDE 40 MG/1
40 TABLET ORAL
Status: DISCONTINUED | OUTPATIENT
Start: 2021-03-30 | End: 2021-03-31 | Stop reason: HOSPADM

## 2021-03-29 RX ORDER — SODIUM CHLORIDE 1000 MG
1 TABLET, SOLUBLE MISCELLANEOUS 2 TIMES DAILY WITH MEALS
Status: DISCONTINUED | OUTPATIENT
Start: 2021-03-29 | End: 2021-03-29

## 2021-03-29 RX ORDER — FUROSEMIDE 10 MG/ML
40 INJECTION INTRAMUSCULAR; INTRAVENOUS ONCE
Status: COMPLETED | OUTPATIENT
Start: 2021-03-29 | End: 2021-03-29

## 2021-03-29 RX ORDER — FUROSEMIDE 40 MG/1
40 TABLET ORAL DAILY
Status: DISCONTINUED | OUTPATIENT
Start: 2021-03-30 | End: 2021-03-29

## 2021-03-29 RX ADMIN — ATENOLOL 25 MG: 25 TABLET ORAL at 08:08

## 2021-03-29 RX ADMIN — AMLODIPINE BESYLATE 5 MG: 5 TABLET ORAL at 08:07

## 2021-03-29 RX ADMIN — FLUTICASONE PROPIONATE 1 SPRAY: 50 SPRAY, METERED NASAL at 08:08

## 2021-03-29 RX ADMIN — FUROSEMIDE 40 MG: 10 INJECTION, SOLUTION INTRAVENOUS at 09:48

## 2021-03-29 RX ADMIN — RIVAROXABAN 20 MG: 20 TABLET, FILM COATED ORAL at 08:08

## 2021-03-29 RX ADMIN — PRAVASTATIN SODIUM 40 MG: 40 TABLET ORAL at 08:08

## 2021-03-29 RX ADMIN — POLYETHYLENE GLYCOL 3350 17 G: 17 POWDER, FOR SOLUTION ORAL at 14:21

## 2021-03-29 RX ADMIN — ALLOPURINOL 200 MG: 100 TABLET ORAL at 08:07

## 2021-03-29 NOTE — PROGRESS NOTES
[address]  Progress Note Saul Ree 1933, 80 y o  female MRN: [id number]  Unit/Bed#: -01 Encounter: [id number]  Primary Care Provider: Albert Mercedes DO   Date and time admitted to hospital: 3/27/2021  8:02 AM    * Acute respiratory failure with hypoxia [facility])  Assessment & Plan  Patient has acute respiratory failure with hypoxia  Currently requiring 2 L of oxygen to maintain pulse ox of more than 92%  Does not use any oxygen at home  Acute respiratory failure is probably secondary to atelectasis and obesity hypoventilation syndrome  No evidence of large effusions or pneumonia noted on CT chest   Unlikely pulmonary embolism as patient is already on anticoagulation  Will do home oxygen requirement study today  Restart Lasix and reassess    Hyponatremia  Assessment & Plan  Patient appears to have hypovolemic hyponatremia at this time  She was recently increased on her diuretics and metolazone was added  She appears to have inadequate oral intake  reviewed urine osmolarity, serum osmolarity and serum sodium and appears consistent with SIADH  Sodium improved to 129 this morning  Discontinue IV fluids  Received 1 dose of IV Lasix  recheck BMP q 12 for now  Correct serum sodium by 8 mEq 24 hours  Baseline sodium is 135-138  Appreciate input from Nephrology  Normal TSH  CT chest does not show any evidence of malignancy  Will give 1 dose of Lasix 40 mg IV x1 and restart Lasix 40 mg oral twice daily from tomorrow and monitor sodium levels q 12  Also placed on fluid restriction of 1200 cc/24 hrs    Other secondary gout, multiple sites  Assessment & Plan  Stable at this time  Not in exacerbation  Continue allopurinol    Paroxysmal A-fib (HCC)  Assessment & Plan  Currently rate controlled  Continue to atenolol for rate control and Xarelto for anticoagulation  CTA chest shows katheryn cava    No need for venous Dopplers to be done as patient is already on anticoagulation    Mixed hyperlipidemia  Assessment & Plan  Continue statin therapy    Essential hypertension  Assessment & Plan  Blood pressure is currently controlled  Will continue Norvasc and tenormin  Hold metolazone, Lotensin for now    Morbid obesity [facility])  Assessment & Plan  Will need counseling on diet exercise and lifestyle modification  BMI is 36 15      VTE Pharmacologic Prophylaxis:   Pharmacologic: Rivaroxaban (Xarelto)  Mechanical VTE Prophylaxis in Place: Yes    Patient Centered Rounds: I have performed bedside rounds with nursing staff today  Discussions with Specialists or Other Care Team Provider:  Will discuss with Nephrology    Education and Discussions with Family / Patient:  Discussed with patient at bedside    Time Spent for Care: 30 minutes  More than 50% of total time spent on counseling and coordination of care as described above  Current Length of Stay: 2 day(s)    Current Patient Status: Inpatient   Certification Statement: The patient will continue to require additional inpatient hospital stay due to Acute hyponatremia    Discharge Plan:  Anticipate discharge in the next 1-2 days once sodium is above 131  PT OT eval    Code Status: Level 1 - Full Code      Subjective:   Patient denies any chest pain or shortness of breath or abdominal pain today  States she is feeling better but still requiring oxygen  Sodium still stuck at 129  Complains of constipation    Objective:     Vitals:   Temp (24hrs), Av 1 °F (36 7 °C), Min:98 °F (36 7 °C), Max:98 2 °F (36 8 °C)    Temp:  [98 °F (36 7 °C)-98 2 °F (36 8 °C)] 98 °F (36 7 °C)  HR:  [62-68] 62  Resp:  [18-20] 18  BP: (125-135)/(55-61) 135/61  SpO2:  [94 %-95 %] 94 %  Body mass index is 34 51 kg/m²  Input and Output Summary (last 24 hours):        Intake/Output Summary (Last 24 hours) at 3/29/2021 0919  Last data filed at 3/29/2021 0809  Gross per 24 hour   Intake 480 ml   Output 1400 ml   Net -920 ml       Physical Exam: Physical Exam  Vitals signs and nursing note reviewed  Constitutional:       Appearance: Normal appearance  HENT:      Head: Normocephalic and atraumatic  Right Ear: External ear normal       Left Ear: External ear normal       Nose: Nose normal       Mouth/Throat:      Pharynx: Oropharynx is clear  Neck:      Musculoskeletal: Normal range of motion and neck supple  Cardiovascular:      Rate and Rhythm: Normal rate and regular rhythm  Heart sounds: Normal heart sounds  Pulmonary:      Effort: Pulmonary effort is normal       Comments: Moderate air entry bilaterally mild decreased breath sounds bilateral bases  Abdominal:      General: Bowel sounds are normal       Palpations: Abdomen is soft  Tenderness: There is no abdominal tenderness  Musculoskeletal: Normal range of motion  Right lower leg: Edema present  Left lower leg: Edema present  Skin:     General: Skin is warm and dry  Capillary Refill: Capillary refill takes less than 2 seconds  Neurological:      General: No focal deficit present  Mental Status: She is alert and oriented to person, place, and time     Psychiatric:         Mood and Affect: Mood normal            Additional Data:     Labs:    Results from last 7 days   Lab Units 03/28/21  0458 03/27/21  0825   WBC Thousand/uL 3 02* 3 41*   HEMOGLOBIN g/dL 11 0* 12 3   HEMATOCRIT % 33 6* 36 8   PLATELETS Thousands/uL 55* 60*   NEUTROS PCT %  --  76*   LYMPHS PCT %  --  14   MONOS PCT %  --  9   EOS PCT %  --  1     Results from last 7 days   Lab Units 03/28/21  1645  03/27/21  0825   SODIUM mmol/L 129*   < > 125*   POTASSIUM mmol/L 3 5   < > 3 2*   CHLORIDE mmol/L 91*   < > 88*   CO2 mmol/L 36*   < > 34*   BUN mg/dL 15   < > 16   CREATININE mg/dL 0 82   < > 1 07   ANION GAP mmol/L 2*   < > 3*   CALCIUM mg/dL 9 1   < > 9 6   ALBUMIN g/dL  --   --  3 9   TOTAL BILIRUBIN mg/dL  --   --  1 48*   ALK PHOS U/L  --   --  61   ALT U/L  --   --  27   AST U/L  -- --  37   GLUCOSE RANDOM mg/dL 99   < > 124    < > = values in this interval not displayed  Results from last 7 days   Lab Units 03/27/21  0825   LACTIC ACID mmol/L 2 0           * I Have Reviewed All Lab Data Listed Above  * Additional Pertinent Lab Tests Reviewed: Chip 66 Admission Reviewed    Imaging:    Imaging Reports Reviewed Today Include:  None  Imaging Personally Reviewed by Myself Includes:  None    Recent Cultures (last 7 days):           Last 24 Hours Medication List:   Current Facility-Administered Medications   Medication Dose Route Frequency Provider Last Rate    acetaminophen  650 mg Oral Q6H PRN Rosita Hurt MD      allopurinol  200 mg Oral Daily Rosita Hurt MD      amLODIPine  5 mg Oral Daily Rosita Hurt MD      atenolol  25 mg Oral Daily Rosita Hurt MD      brinzolamide  1 drop Both Eyes TID Rosita Hurt MD      calcium carbonate  1,000 mg Oral Daily PRN Rosita Hurt MD      fluticasone  1 spray Each Nare Daily Rosita Hurt MD      furosemide  40 mg Intravenous Once Rosita Hurt MD      [START ON 3/30/2021] furosemide  40 mg Oral BID (diuretic) Rosita Hurt MD      ondansetron  4 mg Intravenous Q6H PRN Rosita Hurt MD      pravastatin  40 mg Oral Daily Rosita Hurt MD      rivaroxaban  20 mg Oral Daily Rosita Hurt MD          Today, Patient Was Seen By: Rosita Hurt MD    ** Please Note: Dictation voice to text software may have been used in the creation of this document   **

## 2021-03-29 NOTE — PROGRESS NOTES
Progress Note - Nephrology   Jostin Mcdonald 80 y o  female MRN: [id number]  Unit/Bed#: -01 Encounter: [id number]    A/P:  1  Hyponatremia  Admitted with serum sodium of 125 millimoles per L which repeat was 124 millimoles per L  She was started on diuretics and the following morning his serum sodium was 129 millimoles per L  With the addition of saline  This morning his serum sodium is 127  Her urine and serum osmolalities are consistent with SIADH  However she appears to have hypervolemic hyponatremia  Will continue fluid restriction and diuretic therapy  Obtain daily weights a standing scale and monitor I/O  Correct serum sodium at no faster than 1 5 mEq/hour    2  Hypokalemia  Resolved with supplementation  Metolazone was held which contributed to significant hypokalemia    3  Bilateral lower extremity edema  Weights may be unreliable  She has approximately 1 L negative on I/O  Continue diuretic therapy with fluid restriction          Follow up reason for today's visit:  Hyponatremia    Acute respiratory failure with hypoxia [facility])    Patient Active Problem List   Diagnosis    Acute respiratory failure with hypoxia (HCC)    Hyponatremia    Morbid obesity ([address] )    Essential hypertension    Mixed hyperlipidemia    Paroxysmal A-fib ([facility] [address]    Other secondary gout, multiple sites         Subjective:   Says shortness of breath is improved  Denies chest pain abdominal pain nausea vomiting or dysuria    Objective:     Vitals: Blood pressure 114/59, pulse (!) 53, temperature 97 8 °F (36 6 °C), resp  rate 18, height 5' 6" (1 676 m), weight 97 kg (213 lb 12 8 oz), SpO2 92 %  ,Body mass index is 34 51 kg/m²      Weight (last 2 days)     Date/Time   Weight    03/29/21 0600   97 (213 8)    03/27/21 0811   102 (224)                Intake/Output Summary (Last 24 hours) at 3/29/2021 1628  Last data filed at 3/29/2021 1607  Gross per 24 hour   Intake 840 ml   Output 1550 ml   Net -710 ml     I/O last 3 completed shifts: In: 2482 9 [P O :560; I V :1922 9]  Out: 2750 [Urine:2750]         Physical Exam: /59   Pulse (!) 53   Temp 97 8 °F (36 6 °C)   Resp 18   Ht 5' 6" (1 676 m)   Wt 97 kg (213 lb 12 8 oz)   SpO2 92%   BMI 34 51 kg/m²     General Appearance:    Alert, cooperative, no distress, appears stated age   Head:    Normocephalic, without obvious abnormality, atraumatic   Eyes:    Conjunctiva/corneas clear   Ears:    Normal external ears   Nose:   Nares normal, septum midline, mucosa normal, no drainage    or sinus tenderness   Throat:   Lips, mucosa, and tongue normal; teeth and gums normal   Neck:   Supple, symmetrical, trachea midline, no adenopathy;        thyroid:  No enlargement/tenderness/nodules; no carotid    bruit or JVD   Back:     Symmetric, no curvature, ROM normal, no CVA tenderness   Lungs:     Exp crackles to auscultation bilaterally, respirations unlabored   Chest wall:    No tenderness or deformity   Heart:    Regular rate and rhythm, S1 and S2 normal, no murmur, rub   or gallop   Abdomen:     Soft, non-tender, bowel sounds active   Extremities:   Extremities normal, atraumatic, no cyanosis has 1+ edema  Son at bedside said her legs are "half their size"   Skin:   Skin color, texture, turgor normal, no rashes or lesions   Lymph nodes:   Cervical normal   Neurologic:   CNII-XII intact            Lab, Imaging and other studies: I have personally reviewed pertinent labs  CBC: No results found for: WBC, HGB, HCT, MCV, PLT, ADJUSTEDWBC, MCH, MCHC, RDW, MPV, NRBC  CMP:   Lab Results   Component Value Date    K 3 5 03/29/2021    CL 91 (L) 03/29/2021    CO2 35 (H) 03/29/2021    BUN 15 03/29/2021    CREATININE 0 88 03/29/2021    CALCIUM 9 1 03/29/2021    EGFR 59 03/29/2021           Results from last 7 days   Lab Units 03/29/21  1018 03/28/21  1645 03/28/21  0458  03/27/21  0825   POTASSIUM mmol/L 3 5 3 5 3 6   < > 3 2*   CHLORIDE mmol/L 91* 91* 93*   < > 88*   CO2 mmol/L 35* 36* 33*   < > 34*   BUN mg/dL 15 15 13   < > 16   CREATININE mg/dL 0 88 0 82 0 89   < > 1 07   CALCIUM mg/dL 9 1 9 1 8 6   < > 9 6   ALK PHOS U/L  --   --   --   --  61   ALT U/L  --   --   --   --  27   AST U/L  --   --   --   --  37    < > = values in this interval not displayed  Phosphorus: No results found for: PHOS  Magnesium: No results found for: MG  Urinalysis: No results found for: Shaina Stain, SPECGRAV, PHUR, LEUKOCYTESUR, NITRITE, PROTEINUA, GLUCOSEU, KETONESU, BILIRUBINUR, BLOODU  Ionized Calcium: No results found for: CAION  Coagulation: No results found for: PT, INR, APTT  Troponin: No results found for: TROPONINI  ABG: No results found for: PHART, CZY9PMU, PO2ART, FKV4GTO, J4TGTMPL, BEART, SOURCE  Radiology review:     IMAGING  Procedure: Xr Chest Portable    Result Date: 3/27/2021  Narrative: CHEST INDICATION:  Cough  COMPARISON:  None EXAM PERFORMED/VIEWS:  XR CHEST PORTABLE FINDINGS: Cardiac silhouette is top normal to borderline enlarged  Patient is rotated to the left  Central vascular prominence  No focal infiltrates  No pleural effusions or pneumothorax  Severe osteoarthritis bilateral glenohumeral joints  Impression: Central vascular congestion  No focal infiltrates  Workstation performed: [id number]     Procedure: Cta Abdominal W Run Off W Wo Contrast    Result Date: 3/27/2021  Narrative: CT ANGIOGRAM OF THE CHEST ABDOMEN AND LOWER EXTREMITIES WITH IV CONTRAST INDICATION:  Abdominal pain and left lower extremity pain COMPARISON: None are available at this institution  TECHNIQUE:  CT angiogram examination of the abdomen, pelvis, and lower extremities was performed according to standard protocol with intravenous contrast   This examination, like all CT scans performed in the [facility], was performed utilizing techniques to minimize radiation dose exposure, including the use of iterative reconstruction and automated exposure control    3D reconstructions were performed an independent workstation, and are supplied for review  Rad dose 3540 mGy-cm IV Contrast:  130 mL of iohexol (OMNIPAQUE)  FINDINGS: VASCULAR STRUCTURES:   Within the chest, the aorta has diffuse atherosclerotic disease present  There is no aneurysm present  Standard branching anatomy of the great vessels is noted without origin stenosis  The descending thoracic aorta is tortuous, but  normal in size  Below the diaphragm, all 3 mesenteric vessels are widely patent  Single renal arteries are present bilaterally without stenosis  There is diffuse calcification involving the infrarenal aorta and extending into the iliac vessels  No significant stenosis is present  Examination of the right lower extremity demonstrates the common femoral artery, profunda femoris artery, superficial femoral artery, popliteal artery, three-vessel runoff is patent  There are some calcifications present within the popliteal artery, but no significant stenosis  Venous filling is noted with superficial varicosities present, and there is significant edema below the knee  Examination of the left lower extremity demonstrates the common femoral artery, profunda femoris artery, superficial femoral artery, and popliteal artery, are patent  There are some calcifications present within the popliteal artery, but no significant stenosis  The posterior tibial and peroneal arteries are patent, but there is a large calcific plaque in the proximal anterior tibial artery resulting in a high-grade chronic stenosis  Venous filling is noted with superficial varicosities present, and there is significant edema below the knee  There is no significant venous filling above the level of the knees due to the technique used for the CTA study, however, it is noted that the inferior vena cava is a "megacava", measuring 4 cm at the inferior renal veins, and 3 5 cm just below the level  of the renal veins, closer to the iliac confluence it measures approximately 3 cm  Since there is no significant venous enhancement in the inferior vena cava, it is not possible to know if an intravascular mass or other lesion is present producing this enlargement, or if this is just an usually large IVC  Further imaging will be needed to exclude an intravascular obstructing or partially obstructing lesion  There is no evidence of pulmonary embolism  OTHER FINDINGS HEART:  The heart is enlarged, particularly the atria  No pericardial effusions  LUNGS: There is significant motion artifact secondary to breathing, limiting evaluation of the lungs, however, there is no gross abnormality  PLEURA:  A trace right pleural effusion is present    MEDIASTINUM AND HARSH:  Mediastinal lymph nodes are present which are upper limits of normal  CHEST WALL AND LOWER NECK: Normal  ABDOMEN LIVER/BILIARY TREE:  Unremarkable  GALLBLADDER:  Gallbladder surgically absent  SPLEEN:  Unremarkable  Normal size  PANCREAS:  Unremarkable  ADRENAL GLANDS: Unremarkable  KIDNEYS/URETERS:  No solid renal mass  No hydronephrosis  No urinary tract calculi  PELVIS REPRODUCTIVE ORGANS:  Not visualized  URINARY BLADDER:  Unremarkable  ADDITIONAL ABDOMINAL AND PELVIC STRUCTURES STOMACH AND BOWEL:  Unremarkable  ABDOMINOPELVIC CAVITY:   No pathologically enlarged mesenteric or retroperitoneal lymph nodes  No ascites or free intraperitoneal air  ABDOMINAL WALL/INGUINAL REGIONS:  Unremarkable  OSSEOUS STRUCTURES:  No acute fracture or destructive osseous lesion  Significant degenerative changes are noted in both shoulders and throughout the spine with an S-curve scoliosis present  Impression: 1  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 as described above  An intravascular lesion or mass cannot be excluded based on this study alone  Additional imaging is recommended such as an MRI or potentially a CT venogram  2  Diffuse atherosclerotic disease as described above with no significant lesions as described above   3  Soft tissue edema loaded below the knees bilaterally with superficial varicosities present  4  Cardiac enlargement  5  Trace right pleural effusion  6  DJD  The significant findings were discussed with Dr Reji Dent directly by telephone  Workstation performed: [id number]       Current Facility-Administered Medications   Medication Dose Route Frequency    acetaminophen (TYLENOL) tablet 650 mg  650 mg Oral Q6H PRN    allopurinol (ZYLOPRIM) tablet 200 mg  200 mg Oral Daily    amLODIPine (NORVASC) tablet 5 mg  5 mg Oral Daily    atenolol (TENORMIN) tablet 25 mg  25 mg Oral Daily    brinzolamide (AZOPT) 1 % ophthalmic suspension 1 drop  1 drop Both Eyes TID    calcium carbonate (TUMS) chewable tablet 1,000 mg  1,000 mg Oral Daily PRN    fluticasone (FLONASE) 50 mcg/act nasal spray 1 spray  1 spray Each Nare Daily    [START ON 3/30/2021] furosemide (LASIX) tablet 40 mg  40 mg Oral BID (diuretic)    ondansetron (ZOFRAN) injection 4 mg  4 mg Intravenous Q6H PRN    polyethylene glycol (MIRALAX) packet 17 g  17 g Oral Daily PRN    pravastatin (PRAVACHOL) tablet 40 mg  40 mg Oral Daily    rivaroxaban (XARELTO) tablet 20 mg  20 mg Oral Daily     Medications Discontinued During This Encounter   Medication Reason    rivaroxaban (XARELTO) tablet 15 mg     sodium chloride 0 9 % infusion     sodium chloride tablet 1 g     furosemide (LASIX) tablet 40 mg        Aristides Sood MD      This progress note was produced in part using a dictation device which may document imprecise wording from author's original intent

## 2021-03-29 NOTE — ASSESSMENT & PLAN NOTE
Patient has acute respiratory failure with hypoxia  Currently requiring 2 L of oxygen to maintain pulse ox of more than 92%  Does not use any oxygen at home  Acute respiratory failure is probably secondary to atelectasis and obesity hypoventilation syndrome  No evidence of large effusions or pneumonia noted on CT chest   Unlikely pulmonary embolism as patient is already on anticoagulation  Will do home oxygen requirement study today    Restart Lasix and reassess

## 2021-03-29 NOTE — PLAN OF CARE
Problem: Potential for Falls  Goal: Patient will remain free of falls  Description: INTERVENTIONS:  - Assess patient frequently for physical needs  -  Identify cognitive and physical deficits and behaviors that affect risk of falls    -  [place] fall precautions as indicated by assessment   - Educate patient/family on patient safety including physical limitations  - Instruct patient to call for assistance with activity based on assessment  - Modify environment to reduce risk of injury  - Consider OT/PT consult to assist with strengthening/mobility  Outcome: Progressing     Problem: PAIN - ADULT  Goal: Verbalizes/displays adequate comfort level or baseline comfort level  Description: Interventions:  - Encourage patient to monitor pain and request assistance  - Assess pain using appropriate pain scale  - Administer analgesics based on type and severity of pain and evaluate response  - Implement non-pharmacological measures as appropriate and evaluate response  - Consider cultural and social influences on pain and pain management  - Notify physician/advanced practitioner if interventions unsuccessful or patient reports new pain  Outcome: Progressing     Problem: INFECTION - ADULT  Goal: Absence or prevention of progression during hospitalization  Description: INTERVENTIONS:  - Assess and monitor for signs and symptoms of infection  - Monitor lab/diagnostic results  - Monitor all insertion sites, i e  indwelling lines, tubes, and drains  - Monitor endotracheal if appropriate and nasal secretions for changes in amount and color  - [place] appropriate cooling/warming therapies per order  - Administer medications as ordered  - Instruct and encourage patient and family to use good hand hygiene technique  - Identify and instruct in appropriate isolation precautions for identified infection/condition  Outcome: Progressing     Problem: SAFETY ADULT  Goal: Patient will remain free of falls  Description: INTERVENTIONS:  - Assess patient frequently for physical needs  -  Identify cognitive and physical deficits and behaviors that affect risk of falls    -  [place] fall precautions as indicated by assessment   - Educate patient/family on patient safety including physical limitations  - Instruct patient to call for assistance with activity based on assessment  - Modify environment to reduce risk of injury  - Consider OT/PT consult to assist with strengthening/mobility  Outcome: Progressing  Goal: Maintain or return to baseline ADL function  Description: INTERVENTIONS:  -  Assess patient's ability to carry out ADLs; assess patient's baseline for ADL function and identify physical deficits which impact ability to perform ADLs (bathing, care of mouth/teeth, toileting, grooming, dressing, etc )  - Assess/evaluate cause of self-care deficits   - Assess range of motion  - Assess patient's mobility; develop plan if impaired  - Assess patient's need for assistive devices and provide as appropriate  - Encourage maximum independence but intervene and supervise when necessary  - Involve family in performance of ADLs  - Assess for home care needs following discharge   - Consider OT consult to assist with ADL evaluation and planning for discharge  - Provide patient education as appropriate  Outcome: Progressing  Goal: Maintain or return mobility status to optimal level  Description: INTERVENTIONS:  - Assess patient's baseline mobility status (ambulation, transfers, stairs, etc )    - Identify cognitive and physical deficits and behaviors that affect mobility  - Identify mobility aids required to assist with transfers and/or ambulation (gait belt, sit-to-stand, lift, walker, cane, etc )  - [place] fall precautions as indicated by assessment  - Record patient progress and toleration of activity level on Mobility SBAR; progress patient to next Phase/Stage  - Instruct patient to call for assistance with activity based on assessment  - Consider rehabilitation consult to assist with strengthening/weightbearing, etc   Outcome: Progressing     Problem: DISCHARGE PLANNING  Goal: Discharge to home or other facility with appropriate resources  Description: INTERVENTIONS:  - Identify barriers to discharge w/patient and caregiver  - Arrange for needed discharge resources and transportation as appropriate  - Identify discharge learning needs (meds, wound care, etc )  - Arrange for interpretive services to assist at discharge as needed  - Refer to Case Management Department for coordinating discharge planning if the patient needs post-hospital services based on physician/advanced practitioner order or complex needs related to functional status, cognitive ability, or social support system  Outcome: Progressing     Problem: Knowledge Deficit  Goal: Patient/family/caregiver demonstrates understanding of disease process, treatment plan, medications, and discharge instructions  Description: Complete learning assessment and assess knowledge base    Interventions:  - Provide teaching at level of understanding  - Provide teaching via preferred learning methods  Outcome: Progressing

## 2021-03-29 NOTE — PLAN OF CARE
Problem: PHYSICAL THERAPY ADULT  Goal: Performs mobility at highest level of function for planned discharge setting  See evaluation for individualized goals  Description: Treatment/Interventions: Functional transfer training, LE strengthening/ROM, Elevations, Therapeutic exercise, Endurance training, Patient/family training, Equipment eval/education, Bed mobility, Gait training, Compensatory technique education, Spoke to nursing, Spoke to case management, OT  Equipment Recommended: (walker-pt has)       See flowsheet documentation for full assessment, interventions and recommendations  Note: Prognosis: Good  Problem List: Decreased strength, Decreased endurance, Impaired balance, Decreased mobility, Decreased safety awareness, Obesity  Assessment: Pt is a 80 y o  female seen for PT evaluation s/p admission to [address] [place] [address] on 3/27/2021 with Acute respiratory failure with hypoxia ([facility] [address] )  Order placed for PT services  Upon evaluation: Pt is presenting with impaired functional mobility due to decreased strength, decreased endurance, impaired balance, gait deviations, decreased safety awareness, impaired hearing, fall risk and LE edema requiring stand by to steadying assistance for transfers and steadying assistance for ambulation with RW  Pt's clinical presentation is currently unstable/unpredictable given the functional mobility deficits above, especially weakness, edema of extremities, decreased endurance, gait deviations, decreased activity tolerance, decreased functional mobility tolerance, decreased safety awareness and SOB upon exertion, coupled with fall risks as indicated by AM-PAC 6-Clicks: 31/25 as well as hx of falls, impaired balance, polypharmacy and decreased safety awareness and combined with medical complications of abnormal WBCs, abnormal sodium values, abnormal potassium values, new onset O2 use, abnormal CO2 values and abnormal D dimer, BNP and CRP    Pt's PMHx and comorbidities that may affect physical performance and progress include: CHF, A fib, HTN, CAD, obesity and HLD  Personal factors affecting pt at time of IE include: step(s) to enter environment, advanced age, inability to perform ADLs, inability to navigate level surfaces without external assistance and recent fall(s)/fall history  Pt will benefit from continued skilled PT services to address deficits as defined above and to maximize level of functional mobility to facilitate return toward PLOF and improved QOL  From PT/mobility standpoint, recommendation at time of d/c would be Home PT, home with family support and with walker pending progress in order to reduce fall risk and maximize pt's functional independence and consistency with mobility in order to facilitate return to PLOF  Recommend ther ex next 1-2 sessions  Barriers to Discharge: None     PT Discharge Recommendation: Home with skilled therapy, Return to previous environment with social support(HHPT)     PT - OK to Discharge: (when medically cleared)    See flowsheet documentation for full assessment

## 2021-03-29 NOTE — PLAN OF CARE
Problem: OCCUPATIONAL THERAPY ADULT  Goal: Performs self-care activities at highest level of function for planned discharge setting  See evaluation for individualized goals  Description: Treatment Interventions: ADL retraining, Functional transfer training, UE strengthening/ROM, Endurance training, Patient/family training, Equipment evaluation/education, Compensatory technique education, Activityengagement, Energy conservation          See flowsheet documentation for full assessment, interventions and recommendations  Note: Limitation: Decreased ADL status, Decreased UE strength, Decreased Safe judgement during ADL, Decreased endurance, Decreased high-level ADLs  Prognosis: Good  Assessment: Pt is a 81 yo female seen for OT eval s/p adm to [facility] on 3/27/21 w/ SOB dx'd w/ ARF w/ hypoxia  Pt  has a past medical history of A-fib [facility]), Coronary artery disease, Dizziness, Edema, GERD (gastroesophageal reflux disease), High cholesterol, and Hypertension  Pt with active OT orders and up with assistance  orders  Pt is retired and resides w/ son in [facility] w/ 1STE  Pt reports her son is a farmer, however can provide A as needed  Pt was I w/ most  ADLS (son A w/ LB dressing) and son A w/ all IADLS, does not drive, & required use of DME PTA, including RW for mobility  Pt is currently demonstrating the following occupational deficits: Min A all self care, S bed mobility, S functional transfers and mobility w/ RW, Min A toilet tranfers  These deficits that are impacting pt's baseline areas of occupation are a result of the following impairments: endurance, activity tolerance, functional mobility, balance, functional standing tolerance, unsupportive home environment, decreased I w/ ADLS/IADLS, strength and decreased safety awareness  The following Occupational Performance Areas to address include: grooming, bathing/shower, toilet hygiene, dressing, medication management, health maintenance and clothing management    Based on the aforementioned OT evaluation, functional performance deficits, and assessments, pt has been identified as a high complexity evaluation  The patient's raw score on the AM-PAC Daily Activity inpatient short form is 19, standardized score is 40 22, greater than 39 4  Patients at this level are likely to benefit from discharge to home  Please refer to the recommendation of the Occupational Therapist for safe discharge planning  Recommend home with family support and home OT upon D/C   Pt to continue to benefit from acute immediate OT services to address the following goals 3-5x/week to  w/in 7-10 days:      OT Discharge Recommendation: Home with skilled therapy(home OT and increased social support)  OT - OK to Discharge: Yes(when medically cleared)

## 2021-03-29 NOTE — OCCUPATIONAL THERAPY NOTE
Occupational Therapy Evaluation      Andrew Perez    3/29/2021    Principal Problem:    Acute respiratory failure with hypoxia ([facility] )  Active Problems:    Hyponatremia    Morbid obesity ([facility] )    Essential hypertension    Mixed hyperlipidemia    Paroxysmal A-fib (HCC)    Other secondary gout, multiple sites      Past Medical History:   Diagnosis Date    A-fib ([facility] )     Coronary artery disease     Dizziness     Edema     GERD (gastroesophageal reflux disease)     High cholesterol     Hypertension        Past Surgical History:   Procedure Laterality Date    CHOLECYSTECTOMY OPEN          03/29/21 1319   OT Last Visit   OT Visit Date 03/29/21   Note Type   Note type Evaluation   Restrictions/Precautions   Weight Bearing Precautions Per Order No   Other Precautions Chair Alarm; Bed Alarm;Multiple lines;O2;Fall Risk;Hard of hearing  (2L NC O2)   Pain Assessment   Pain Assessment Tool Pain Assessment not indicated - pt denies pain   Pain Score No Pain   Home Living   Type of [address] [place] [address] One level;Performs ADLs on one level; Able to live on main level with bedroom/bathroom;Stairs to enter with rails  ([facility]; 1STE)   Bathroom Shower/Tub Walk-in shower   Bathroom Toilet Raised   Bathroom Equipment Shower chair;Commode  (denies use PTA)   [address]; Hospital bed; Other (Comment)  (rollator; x2 standard walker)   Additional Comments Pt reports use of RW for all mobility   Prior Function   Level of [place] Needs assistance with ADLs and functional mobility; Needs assistance with IADLs   Lives With Son   Receives Help From Family   ADL Assistance Needs assistance   IADLs Needs assistance   Falls in the last 6 months 1 to 4  (1 recent fall, per Pt)   Vocational Retired   Lifestyle   Autonomy Pt reports being mostly IND w/ all ADLS (son A w/ donning socks/shoes/compression stockings); son A w/ all IADLS; (-) drives; ambulates w/ RW; IND w/ medication management PTA Reciprocal Relationships Pt reports living w/ her son who is a braun and is primarily out of the house during the day, however "comes and goes" depending on his farming tasks  Pt reports her son is available in the evenings to provide A as needed upon D/C  Service to Others Pt is retired   Intrinsic Gratification Pt reports enjoying crossword puzzles   Psychosocial   Psychosocial (WDL) WDL   Subjective   Subjective "I don't know why I need oxygen when I go home "   ADL   Where Assessed Edge of bed   Eating Assistance 5  Supervision/Setup   Grooming Assistance 5  Supervision/Setup   UB [place] [address]  [address]  4  Minimal Assistance   Bed Mobility   Supine to Sit 5  Supervision   Additional items Assist x 1; Increased time required;Verbal cues;HOB elevated; Bedrails   Sit to Supine Unable to assess   Additional Comments Pt laying supine in bed upon OT arrival  Pt requested to return to bed  Pt laying supine in bed w/ HOB elevated and all needs in reach s/p OT session  Transfers   Sit to Stand 5  Supervision   Additional items Assist x 1; Increased time required;Verbal cues;Armrests   Stand to Sit 5  Supervision   Additional items Assist x 1; Increased time required;Verbal cues;Armrests   Toilet transfer 4  Minimal assistance   Additional items Assist x 1; Increased time required;Standard toilet;Verbal cues;Armrests  (A for controlled descent)   Additional Comments Transfers w/ RW  VC required for safety and hand placement  Functional Mobility   Functional Mobility   (steadying)   Additional Comments Pt demonstrated short distance mobility EOB<>toilet w/ RW w/ steadying assist  Pt's SpO2 desat to 88% on 2L NC O2 s/p mobility and recovered to 94% within 2 minutes seated rest break   Pt required VC for safe O2 tubing management during mobility - Pt w/ Fair carryover of education  Additional items Rolling walker   Balance   Static Sitting Fair +   Dynamic Sitting Fair   Static Standing Fair -   Dynamic Standing Fair -   Ambulatory Fair -   Activity Tolerance   Activity Tolerance Patient limited by fatigue;Patient tolerated treatment well   Medical Staff Made Aware PT Mouna Ochoa   Nurse Made Aware RN cleared Pt for OT sesssion   RUE Assessment   RUE Assessment WFL   LUE Assessment   LUE Assessment WFL   Hand Function   Gross Motor Coordination Functional   Fine Motor Coordination Functional   Sensation   Light Touch No apparent deficits   Cognition   Overall Cognitive Status WFL   Arousal/Participation Alert; Cooperative   Attention Attends with cues to redirect   Orientation Level Oriented X4   Memory Within functional limits   Following Commands Follows one step commands without difficulty   Comments Pt is pleasant and cooperative to participate in therapy this day  Pt at times required repeat of commands 2' [place]  Pt w/ good carryover of safety education and line management techniques t/o transfers and mobility  Assessment   Limitation Decreased ADL status; Decreased UE strength;Decreased Safe judgement during ADL;Decreased endurance;Decreased high-level ADLs   Prognosis Good   Assessment Pt is a 79 yo female seen for OT eval s/p adm to [facility] on 3/27/21 w/ SOB dx'd w/ ARF w/ hypoxia  Pt  has a past medical history of A-fib [facility]), Coronary artery disease, Dizziness, Edema, GERD (gastroesophageal reflux disease), High cholesterol, and Hypertension  Pt with active OT orders and up with assistance  orders  Pt is retired and resides w/ son in [facility] w/ 1STE  Pt reports her son is a farmer, however can provide A as needed  Pt was I w/ most  ADLS (son A w/ LB dressing) and son A w/ all IADLS, does not drive, & required use of DME PTA, including RW for mobility   Pt is currently demonstrating the following occupational deficits: Min A all self care, S bed mobility, S functional transfers and mobility w/ RW, Min A toilet tranfers  These deficits that are impacting pt's baseline areas of occupation are a result of the following impairments: endurance, activity tolerance, functional mobility, balance, functional standing tolerance, unsupportive home environment, decreased I w/ ADLS/IADLS, strength and decreased safety awareness  The following Occupational Performance Areas to address include: grooming, bathing/shower, toilet hygiene, dressing, medication management, health maintenance and clothing management  Based on the aforementioned OT evaluation, functional performance deficits, and assessments, pt has been identified as a high complexity evaluation  The patient's raw score on the -PAC Daily Activity inpatient short form is 19, standardized score is 40 22, greater than 39 4  Patients at this level are likely to benefit from discharge to home  Please refer to the recommendation of the Occupational Therapist for safe discharge planning  Recommend home with family support and home OT upon D/C  Pt to continue to benefit from acute immediate OT services to address the following goals 3-5x/week to  w/in 7-10 days:    Goals   Patient Goals To return home   LTG Time Frame 7-10   Long Term Goal #1 Refer to goals below   Plan   Treatment Interventions ADL retraining;Functional transfer training;UE strengthening/ROM; Endurance training;Patient/family training;Equipment evaluation/education; Compensatory technique education; Activityengagement; Energy conservation   Goal Expiration Date 21   OT Frequency 3-5x/wk   Recommendation   OT Discharge Recommendation Home with skilled therapy  (home OT and increased social support)   OT - OK to Discharge Yes  (when medically cleared)   -[facility] Daily Activity Inpatient   Lower Body Dressing 3   Bathing 3   Toileting 3   Upper Body Dressing 3   Grooming 3   Eating 4   Daily Activity Raw Score 19   Daily Activity Standardized Score (Calc for Raw Score >=11) 40 22   AM-PAC Applied Cognition Inpatient   Following a Speech/Presentation 4   Understanding Ordinary Conversation 4   Taking Medications 3   Remembering Where Things Are Placed or Put Away 4   Remembering List of 4-5 Errands 3   Taking Care of Complicated Tasks 3   Applied Cognition Raw Score 21   Applied Cognition Standardized Score 44 3   Modified Pablo Scale   Modified Rawlings Scale 4       GOALS    1) Pt will improve activity tolerance to G for min 30 min txment sessions for increase engagement in functional tasks    2) Pt will complete UB/LB dressing/self care w/ S using adaptive device and DME as needed    3) Pt will complete bathing w/ Mod I w/ use of AE and DME as needed    4) Pt will complete toileting w/ mod I w/ G hygiene/thoroughness using DME as needed    5) Pt will improve functional transfers to Mod I on/off all surfaces using DME as needed w/ G balance/safety     6) Pt will improve functional mobility during ADL/IADL/leisure tasks to Mod I using DME as needed w/ G balance/safety     7) Pt will participate in simulated IADL management task to increase independence to Mod I w/ G safety and endurance    8) Pt will be attentive 100% of the time during ongoing cognitive assessment w/ G participation to assist w/ safe d/c planning/recommendations    9) Pt will demonstrate G carryover of pt/caregiver education and training as appropriate w/o cues w/ good tolerance to increase safety during functional tasks    10) Pt will demonstrate 100% carryover of energy conservation techniques t/o functional I/ADL/leisure tasks w/o cues s/p skilled education to increase endurance during functional tasks           Carlyle Paget, MS, OTR/L

## 2021-03-29 NOTE — CASE MANAGEMENT
HHC is recommended for pt at time of discharge  Pt sts she does not have a preference to a [place] [id number] agency  CM placed referrals to [facility] and [facility]    A post acute care recommendation was made by your care team for [place] [id number]  Discussed Freedom of Choice with patient  List of agencies given to patient via in person  patient aware the list is custom filtered for them by zip code location and that [facility] post acute providers are designated

## 2021-03-29 NOTE — ASSESSMENT & PLAN NOTE
Patient appears to have hypovolemic hyponatremia at this time  She was recently increased on her diuretics and metolazone was added  She appears to have inadequate oral intake  reviewed urine osmolarity, serum osmolarity and serum sodium and appears consistent with SIADH  Sodium improved to 129 this morning  Discontinue IV fluids  Received 1 dose of IV Lasix  recheck BMP q 12 for now  Correct serum sodium by 8 mEq 24 hours  Baseline sodium is 135-138  Appreciate input from Nephrology  Normal TSH  CT chest does not show any evidence of malignancy  Will give 1 dose of Lasix 40 mg IV x1 and restart Lasix 40 mg oral twice daily from tomorrow and monitor sodium levels q 12    Also placed on fluid restriction of 1200 cc/24 hrs

## 2021-03-29 NOTE — PLAN OF CARE
Problem: PHYSICAL THERAPY ADULT  Goal: Performs mobility at highest level of function for planned discharge setting  See evaluation for individualized goals  Description: Treatment/Interventions: Functional transfer training, LE strengthening/ROM, Elevations, Therapeutic exercise, Endurance training, Patient/family training, Equipment eval/education, Bed mobility, Gait training, Compensatory technique education, Spoke to nursing, Spoke to case management, OT  Equipment Recommended: (walker-pt has)       See flowsheet documentation for full assessment, interventions and recommendations  0/[id number]/8069 1283 by Marty Reese PT  Note: Prognosis: Good  Problem List: Decreased strength, Decreased endurance, Impaired balance, Decreased mobility, Decreased safety awareness, Obesity    Pt requires minimal verbal instruction or tactile feedback to perform exercises with proper form and technique  She was able to safely manage O2 tubing with minimal verbal cues  She ambulated and transferred with decreased assistance  She is limited by decreased endurance, balance  She will continue to benefit from PT services to maximize LOF  Barriers to Discharge: None     PT Discharge Recommendation: Home with skilled therapy, Return to previous environment with social support(HHPT)     PT - OK to Discharge: (when medically cleared)    See flowsheet documentation for full assessment

## 2021-03-29 NOTE — PHYSICAL THERAPY NOTE
PHYSICAL THERAPY EVALUATION  NAME:  Priya Ortega  DATE: 03/29/21    AGE:   80 y o  Mrn:   [id number]  ADMIT DX:  Shortness of breath [R06 02]  CHF (congestive heart failure) ([facility]) [I50 9]  Hypoxia [R09 02]    Past Medical History:   Diagnosis Date    A-fib ([facility] )     Coronary artery disease     Dizziness     Edema     GERD (gastroesophageal reflux disease)     High cholesterol     Hypertension      Length Of Stay: 2  Performed at least 2 patient identifiers during session: Name and Birthday  PHYSICAL THERAPY EVALUATION :      03/29/21 1100   PT Last Visit   PT Visit Date 03/29/21   Note Type   Note type Evaluation   Pain Assessment   Pain Assessment Tool 0-10   Pain Score No Pain   Home Living   Type of [address] One level;Performs ADLs on one level; Able to live on main level with bedroom/bathroom; Other (Comment)  (1 KATY)   Bathroom Shower/Tub Walk-in shower   Bathroom Toilet Raised   Bathroom Equipment Shower chair  (doesn't use)   Home Equipment Walker; Hospital bed   Additional Comments Reports using RW for mobility, at times, doesn't use RW   Prior Function   Level of [place] Independent with ADLs and functional mobility  ("I get dressed myself ")   Lives With Son   Receives Help From Family   ADL Assistance Needs assistance  (compression socks, socks and shoes)   IADLs Independent  (helps with cooking)   Falls in the last 6 months 1 to 4   Comments Reports being independent with mobility PTA, has assistance for shoes, socks and compression socks form son, otherwise is independent  Restrictions/Precautions   Other Precautions Chair Alarm;Hard of hearing; Fall Risk;O2  (2 lpm)   Cognition   Orientation Level Oriented X4   Following Commands Follows one step commands without difficulty   Comments repeated cues at times due to [facility]   RUE Assessment   RUE Assessment WFL   LUE Assessment   LUE Assessment WFL   RLE Assessment   RLE Assessment WFL  (grossly 4/5)   LLE Assessment   LLE Assessment WFL  (grossly 4/5)   Bed Mobility   Supine to Sit   (steadying assistance with PCA)   Additional items Assist x 1; Increased time required;HOB elevated   Additional Comments increased time to complete with steadying assistance from PCA   Transfers   Sit to Stand   (stand by assistance)   Additional items Increased time required;Verbal cues   Stand to Sit   (stand by assistance)   Additional items Increased time required;Verbal cues   Stand pivot   (steadying assistance)   Additional items Increased time required;Verbal cues   Additional Comments cues for hand placement for safety with RW   Ambulation/Elevation   Gait pattern Excessively slow; Short stride   Gait Assistance   (steadying to stand by assistance)   Additional items Assist x 1;Verbal cues   Assistive Device Rolling walker   Distance 22'x1, 150'x1 with RW with slow michael with decreased step length, slow michael  Balance   Static Sitting Good   Dynamic Sitting Fair +   Static Standing Fair +   Dynamic Standing Fair   Ambulatory Fair -   Endurance Deficit   Endurance Deficit Yes   Endurance Deficit Description upon arrival to room, patient on room air, moving to sit on EOB with PCA  SpO2 noted to be 87%  increased to 89% briefly then desaturated to 82-83%  O2 applied at 2 lpm  increased to 92% at rest  during ambulation Spo2 maintained at 93-94% on 2 lpm  respiatroty therapist present completing Home O2 study  Activity Tolerance   Activity Tolerance Patient tolerated treatment well   Medical Staff Made Aware OT, Carmen, RT, Arcelia, VINCENT, [place]   Nurse Made Aware denys MALDONADO   Assessment   Prognosis Good   Problem List Decreased strength;Decreased endurance; Impaired balance;Decreased mobility; Decreased safety awareness; Obesity   Barriers to Discharge None   Goals   Patient Goals "Go home"   STG Expiration Date 04/08/21   PT Treatment Day 1   Plan   Treatment/Interventions Functional transfer training;LE strengthening/ROM; Elevations; Therapeutic exercise; Endurance training;Patient/family training;Equipment eval/education; Bed mobility;Gait training; Compensatory technique education;Spoke to nursing;Spoke to case management;OT   PT Frequency Other (Comment)  (3-5x/week)   Recommendation   PT Discharge Recommendation Home with skilled therapy; Return to previous environment with social support  (HHPT)   Equipment Recommended   (walker-pt has)   PT - OK to Discharge   (when medically cleared)   Additional Comments pt sitting in recliner chair at end of session with all needs wihtin reach and posey alarm   AM-PAC Basic Mobility Inpatient   Turning in Bed Without Bedrails 3   Lying on Back to Sitting on Edge of Flat Bed 3   Moving Bed to Chair 3   Standing Up From Chair 3   Walk in Room 3   Climb 3-5 Stairs 3   Basic Mobility Inpatient Raw Score 18   Basic Mobility Standardized Score 41 05     (Please find full objective findings from PT assessment regarding body systems outlined above)  Assessment: Pt is a 80 y o  female seen for PT evaluation s/p admission to [address] [place] [address] on 3/27/2021 with Acute respiratory failure with hypoxia ([facility] [address] )  Order placed for PT services    Upon evaluation: Pt is presenting with impaired functional mobility due to decreased strength, decreased endurance, impaired balance, gait deviations, decreased safety awareness, impaired hearing, fall risk and LE edema requiring stand by to steadying assistance for transfers and steadying assistance for ambulation with RW  Pt's clinical presentation is currently unstable/unpredictable given the functional mobility deficits above, especially weakness, edema of extremities, decreased endurance, gait deviations, decreased activity tolerance, decreased functional mobility tolerance, decreased safety awareness and SOB upon exertion, coupled with fall risks as indicated by AM-PAC 6-Clicks: 91/12 as well as hx of falls, impaired balance, polypharmacy and decreased safety awareness and combined with medical complications of abnormal WBCs, abnormal sodium values, abnormal potassium values, new onset O2 use, abnormal CO2 values and abnormal D dimer, BNP and CRP  Pt's PMHx and comorbidities that may affect physical performance and progress include: CHF, A fib, HTN, CAD, obesity and HLD  Personal factors affecting pt at time of IE include: step(s) to enter environment, advanced age, inability to perform ADLs, inability to navigate level surfaces without external assistance and recent fall(s)/fall history  Pt will benefit from continued skilled PT services to address deficits as defined above and to maximize level of functional mobility to facilitate return toward PLOF and improved QOL  From PT/mobility standpoint, recommendation at time of d/c would be Home PT, home with family support and with walker pending progress in order to reduce fall risk and maximize pt's functional independence and consistency with mobility in order to facilitate return to PLOF  Recommend ther ex next 1-2 sessions  The patient's AM-PAC Basic Mobility Inpatient Short Form Raw Score is 18, Standardized Score is 41 05  A standardized score less than 42 9 suggests the patient may benefit from discharge to post-acute rehabilitation services  Please also refer to the recommendation of the Physical Therapist for safe discharge planning  However, patient lives with son and has assistance from son prn  Goals: Pt will: Perform bed mobility tasks with modified I to reposition in bed and prepare for transfers  Pt will perform transfers with modified I to decrease burden of care and decrease risk for falls and prepare for ambulation  Pt will ambulate with LRAD for >/= 250' with  modified I  to increase Indep in home environment, decrease burden of care, decrease risk for falls, improve activity tolerance and improve gait quality and to access home environment   Pt will complete >/= 4 steps with with unilateral handrail with Supervision to improve activity tolerance  Pt will increase B LE strength >/= 1/2 MMT grade to facilitate functional mobility  Pt will participate in objective balance assessment to determine baseline fall risk  Jeremiah Sommer, PT,DPT     PHYSICAL THERAPY TREATMENT NOTE  Time In:1120  Time [id number]  Total Time: 15'      S: "I didn't qualify for oxygen before "  O:    Exercises    Side/Reps/sets   Heelslides    Glute Sets    Hip Abduction    Hip Adduction    Hip Flexion 1x10 AROM B/L   Knee AROM Short Arc Quad    Ankle Pumps 1x10 AROM B/L   Quad sets    Long Arc Quads 1x10 AROM B/L   Hamstring Stretch    Heel Cord Stretch    Education      Sit<>stand with RW with supervision with min cues for hand placement for safety with mobility  Spt with RW with close supervision    Ambulated 70'x1 with RW with close supervision with patient managing O2 extension tubing with min verbal cues for safe management for navigation in living environment  SpO2 desaturated to 88% with ambulation on 2 lpm and increased to 93% with sitting cues  Cues for pursed lip breathing to recover within 1'  A:  Pt requires minimal verbal instruction or tactile feedback to perform exercises with proper form and technique  She was able to safely manage O2 tubing with minimal verbal cues  She ambulated and transferred with decreased assistance  She is limited by decreased endurance, balance  She will continue to benefit from PT services to maximize LOF  P:  Recommend LE strengthening, dynamic standing balance, endurance training, gait training with LRAD       Jeremiah Sommer, PT, DPT

## 2021-03-29 NOTE — RESPIRATORY THERAPY NOTE
Home Oxygen Qualifying Test       Patient name: Anthony King        : 1933   Date of Test:  2021  Diagnosis:      Home Oxygen Test:    **Medicare Guidelines require item(s) 1-5 on all ambulatory patients or 1 and 2 on non-ambulatory patients  1   Baseline SPO2 on Room Air at rest 89 %  2   SPO2 during exercise on Room Air  82%  During exercise monitor SpO2  If SPO2 increases >=89% with ambulation do not add supplemental             oxygen  If <= 88% on room air add O2 via NC and titrate patient  Patient must be ambulated with O2 and titrated to > 88% with exertion  3   SPO2 on Oxygen at rest 95 % 2 lpm     4   SPO2 during exercise on Oxygen  90% a liter flow of 2 lpm     5   Exercise performed:          walking          [x]  Supplemental Home Oxygen is indicated  []  Client does not qualify for home oxygen  Respiratory Additional Notes- Pt requires 2LPM NC during ambulation       Paty Handley, RT

## 2021-03-29 NOTE — CASE MANAGEMENT
CM met with the patient at bedside to review the CM role and discuss possible dc needs  Pt lives with her son in a one level home with [facility] in [facility]  Pt was IPTA  Pt has DME of a rolling walker, a rollator, a BSC and a shower chair  Pt is ambulatory without assistance  Pt denies any history of drug/etoh abuse, mental illness or inpatient psych admissions  Pt denies any history of SNF/Rehab or VNA  Pt has a  POA, her son, Kirstie Daughters  Pt does not drive, pts son is available for transportation needs  Pt sts she lives with her son, who is a "farmer", he "goes out to work in the morning and comes back in at supper "       [facility]erred [facility]: [facility], [address], [place], [phone]   PCP: Dr Meg Montero    CM reviewed d/c planning process including the following: identifying help at home, patient preference for d/c planning needs, availability of treatment team to discuss questions or concerns patient and/or family may have regarding understanding medications and recognizing signs and symptoms once discharged  CM also encouraged patient to follow up with all recommended appointments after discharge  Patient advised of importance for patient and family to participate in managing patients medical well being

## 2021-03-29 NOTE — PLAN OF CARE
Problem: Potential for Falls  Goal: Patient will remain free of falls  Description: INTERVENTIONS:  - Assess patient frequently for physical needs  -  Identify cognitive and physical deficits and behaviors that affect risk of falls    -  [place] fall precautions as indicated by assessment   - Educate patient/family on patient safety including physical limitations  - Instruct patient to call for assistance with activity based on assessment  - Modify environment to reduce risk of injury  - Consider OT/PT consult to assist with strengthening/mobility  Outcome: Progressing     Problem: PAIN - ADULT  Goal: Verbalizes/displays adequate comfort level or baseline comfort level  Description: Interventions:  - Encourage patient to monitor pain and request assistance  - Assess pain using appropriate pain scale  - Administer analgesics based on type and severity of pain and evaluate response  - Implement non-pharmacological measures as appropriate and evaluate response  - Consider cultural and social influences on pain and pain management  - Notify physician/advanced practitioner if interventions unsuccessful or patient reports new pain  Outcome: Progressing     Problem: INFECTION - ADULT  Goal: Absence or prevention of progression during hospitalization  Description: INTERVENTIONS:  - Assess and monitor for signs and symptoms of infection  - Monitor lab/diagnostic results  - Monitor all insertion sites, i e  indwelling lines, tubes, and drains  - Monitor endotracheal if appropriate and nasal secretions for changes in amount and color  - [place] appropriate cooling/warming therapies per order  - Administer medications as ordered  - Instruct and encourage patient and family to use good hand hygiene technique  - Identify and instruct in appropriate isolation precautions for identified infection/condition  Outcome: Progressing     Problem: SAFETY ADULT  Goal: Patient will remain free of falls  Description: INTERVENTIONS:  - Assess patient frequently for physical needs  -  Identify cognitive and physical deficits and behaviors that affect risk of falls    -  [place] fall precautions as indicated by assessment   - Educate patient/family on patient safety including physical limitations  - Instruct patient to call for assistance with activity based on assessment  - Modify environment to reduce risk of injury  - Consider OT/PT consult to assist with strengthening/mobility  Outcome: Progressing  Goal: Maintain or return to baseline ADL function  Description: INTERVENTIONS:  -  Assess patient's ability to carry out ADLs; assess patient's baseline for ADL function and identify physical deficits which impact ability to perform ADLs (bathing, care of mouth/teeth, toileting, grooming, dressing, etc )  - Assess/evaluate cause of self-care deficits   - Assess range of motion  - Assess patient's mobility; develop plan if impaired  - Assess patient's need for assistive devices and provide as appropriate  - Encourage maximum independence but intervene and supervise when necessary  - Involve family in performance of ADLs  - Assess for home care needs following discharge   - Consider OT consult to assist with ADL evaluation and planning for discharge  - Provide patient education as appropriate  Outcome: Progressing  Goal: Maintain or return mobility status to optimal level  Description: INTERVENTIONS:  - Assess patient's baseline mobility status (ambulation, transfers, stairs, etc )    - Identify cognitive and physical deficits and behaviors that affect mobility  - Identify mobility aids required to assist with transfers and/or ambulation (gait belt, sit-to-stand, lift, walker, cane, etc )  - [place] fall precautions as indicated by assessment  - Record patient progress and toleration of activity level on Mobility SBAR; progress patient to next Phase/Stage  - Instruct patient to call for assistance with activity based on assessment  - Consider rehabilitation consult to assist with strengthening/weightbearing, etc   Outcome: Progressing     Problem: DISCHARGE PLANNING  Goal: Discharge to home or other facility with appropriate resources  Description: INTERVENTIONS:  - Identify barriers to discharge w/patient and caregiver  - Arrange for needed discharge resources and transportation as appropriate  - Identify discharge learning needs (meds, wound care, etc )  - Arrange for interpretive services to assist at discharge as needed  - Refer to Case Management Department for coordinating discharge planning if the patient needs post-hospital services based on physician/advanced practitioner order or complex needs related to functional status, cognitive ability, or social support system  Outcome: Progressing     Problem: Knowledge Deficit  Goal: Patient/family/caregiver demonstrates understanding of disease process, treatment plan, medications, and discharge instructions  Description: Complete learning assessment and assess knowledge base    Interventions:  - Provide teaching at level of understanding  - Provide teaching via preferred learning methods  Outcome: Progressing

## 2021-03-29 NOTE — ASSESSMENT & PLAN NOTE
Blood pressure is currently controlled  Will continue Norvasc and tenormin    Hold metolazone, Lotensin for now

## 2021-03-30 LAB
ANION GAP SERPL CALCULATED.3IONS-SCNC: -1 MMOL/L (ref 4–13)
ANION GAP SERPL CALCULATED.3IONS-SCNC: 0 MMOL/L (ref 4–13)
ANION GAP SERPL CALCULATED.3IONS-SCNC: 1 MMOL/L (ref 4–13)
BUN SERPL-MCNC: 14 MG/DL (ref 5–25)
BUN SERPL-MCNC: 14 MG/DL (ref 5–25)
BUN SERPL-MCNC: 17 MG/DL (ref 5–25)
CALCIUM SERPL-MCNC: 8.9 MG/DL (ref 8.3–10.1)
CALCIUM SERPL-MCNC: 9.1 MG/DL (ref 8.3–10.1)
CALCIUM SERPL-MCNC: 9.2 MG/DL (ref 8.3–10.1)
CHLORIDE SERPL-SCNC: 92 MMOL/L (ref 100–108)
CHLORIDE SERPL-SCNC: 92 MMOL/L (ref 100–108)
CHLORIDE SERPL-SCNC: 93 MMOL/L (ref 100–108)
CO2 SERPL-SCNC: 36 MMOL/L (ref 21–32)
CO2 SERPL-SCNC: 36 MMOL/L (ref 21–32)
CO2 SERPL-SCNC: 37 MMOL/L (ref 21–32)
CREAT SERPL-MCNC: 0.84 MG/DL (ref 0.6–1.3)
CREAT SERPL-MCNC: 0.91 MG/DL (ref 0.6–1.3)
CREAT SERPL-MCNC: 1.01 MG/DL (ref 0.6–1.3)
CREAT UR-MCNC: 62.4 MG/DL
GFR SERPL CREATININE-BSD FRML MDRD: 50 ML/MIN/1.73SQ M
GFR SERPL CREATININE-BSD FRML MDRD: 57 ML/MIN/1.73SQ M
GFR SERPL CREATININE-BSD FRML MDRD: 63 ML/MIN/1.73SQ M
GLUCOSE SERPL-MCNC: 158 MG/DL (ref 65–140)
GLUCOSE SERPL-MCNC: 169 MG/DL (ref 65–140)
GLUCOSE SERPL-MCNC: 95 MG/DL (ref 65–140)
POTASSIUM SERPL-SCNC: 3.5 MMOL/L (ref 3.5–5.3)
POTASSIUM SERPL-SCNC: 3.8 MMOL/L (ref 3.5–5.3)
POTASSIUM SERPL-SCNC: 4 MMOL/L (ref 3.5–5.3)
SODIUM SERPL-SCNC: 128 MMOL/L (ref 136–145)
SODIUM SERPL-SCNC: 129 MMOL/L (ref 136–145)
SODIUM SERPL-SCNC: 129 MMOL/L (ref 136–145)
UUN 24H UR-MCNC: 538 MG/DL

## 2021-03-30 PROCEDURE — 99232 SBSQ HOSP IP/OBS MODERATE 35: CPT | Performed by: INTERNAL MEDICINE

## 2021-03-30 PROCEDURE — 80048 BASIC METABOLIC PNL TOTAL CA: CPT | Performed by: INTERNAL MEDICINE

## 2021-03-30 PROCEDURE — 80048 BASIC METABOLIC PNL TOTAL CA: CPT | Performed by: FAMILY MEDICINE

## 2021-03-30 PROCEDURE — 99232 SBSQ HOSP IP/OBS MODERATE 35: CPT | Performed by: FAMILY MEDICINE

## 2021-03-30 RX ADMIN — ALLOPURINOL 200 MG: 100 TABLET ORAL at 08:22

## 2021-03-30 RX ADMIN — FLUTICASONE PROPIONATE 1 SPRAY: 50 SPRAY, METERED NASAL at 08:22

## 2021-03-30 RX ADMIN — ATENOLOL 25 MG: 25 TABLET ORAL at 08:23

## 2021-03-30 RX ADMIN — FUROSEMIDE 40 MG: 40 TABLET ORAL at 16:50

## 2021-03-30 RX ADMIN — RIVAROXABAN 20 MG: 20 TABLET, FILM COATED ORAL at 08:23

## 2021-03-30 RX ADMIN — FUROSEMIDE 40 MG: 40 TABLET ORAL at 08:23

## 2021-03-30 RX ADMIN — POLYETHYLENE GLYCOL 3350 17 G: 17 POWDER, FOR SOLUTION ORAL at 12:45

## 2021-03-30 RX ADMIN — PRAVASTATIN SODIUM 40 MG: 40 TABLET ORAL at 08:23

## 2021-03-30 RX ADMIN — AMLODIPINE BESYLATE 5 MG: 5 TABLET ORAL at 08:23

## 2021-03-30 NOTE — ASSESSMENT & PLAN NOTE
Patient appears to have hypovolemic hyponatremia at this time  She was recently increased on her diuretics and metolazone was added  She appears to have inadequate oral intake  reviewed urine osmolarity, serum osmolarity and serum sodium and appears consistent with SIADH  Sodium improved to 127  Correct serum sodium by 8 mEq 24 hours  Baseline sodium is 135-138  Appreciate input from Nephrology  Normal TSH  CT chest does not show any evidence of malignancy  Continue p o  Lasix 40 mg oral twice daily from tomorrow and monitor sodium levels q 12    Also placed on fluid restriction of 1200 cc/24 hrs

## 2021-03-30 NOTE — PROGRESS NOTES
Progress Note - Nephrology   Ashley Moraes 80 y o  female MRN: [id number]  Unit/Bed#: -01 Encounter: [id number]    A/P:  1  Hyponatremia  Appears mildly volume overloaded   Continue furosemide and fluid restriction  Will need outpatient monitoring to maintain Na > 130  FeUrea 58%    2  Hypertension  Controlled    3  Paroxysmal atrial fibrillation  Rate controlled    4  Acute kidney injury  Resolved      Follow up reason for today's visit: Hyponatremia and acute kidney injury    Acute respiratory failure with hypoxia [facility])    Patient Active Problem List   Diagnosis    Acute respiratory failure with hypoxia (HCC)    Hyponatremia    Morbid obesity ([address] )    Essential hypertension    Mixed hyperlipidemia    Paroxysmal A-fib (HCC)    Other secondary gout, multiple sites         Subjective:   Denies chest pain, dyspnea, abdominal pain, NVD or dysuria    Objective:     Vitals: Blood pressure 113/52, pulse (!) 53, temperature 98 4 °F (36 9 °C), temperature source Oral, resp  rate 21, height 5' 6" (1 676 m), weight 95 9 kg (211 lb 6 4 oz), SpO2 93 %  ,Body mass index is 34 12 kg/m²  Weight (last 2 days)     Date/Time   Weight    03/30/21 0448   95 9 (211 4)    03/29/21 0600   97 (213 8)                Intake/Output Summary (Last 24 hours) at 3/30/2021 1632  Last data filed at 3/30/2021 1423  Gross per 24 hour   Intake 1320 ml   Output 1075 ml   Net 245 ml     I/O last 3 completed shifts:   In: 1080 [P O :1080]  Out: 1975 [Urine:1975]         Physical Exam: /52 (BP Location: Left arm)   Pulse (!) 53   Temp 98 4 °F (36 9 °C) (Oral)   Resp 21   Ht 5' 6" (1 676 m)   Wt 95 9 kg (211 lb 6 4 oz)   SpO2 93%   BMI 34 12 kg/m²     General Appearance:    Alert, cooperative, no distress, appears stated age   Head:    Normocephalic, without obvious abnormality, atraumatic   Eyes:    Conjunctiva/corneas clear   Ears:    Normal external ears   Nose:   Nares normal, septum midline, mucosa normal, no drainage or sinus tenderness   Throat:   Lips, mucosa, and tongue normal; teeth and gums normal   Neck:   Supple, symmetrical, trachea midline, no adenopathy;        thyroid:  No enlargement/tenderness/nodules; no carotid    bruit or JVD   Back:     Symmetric, no curvature, ROM normal, no CVA tenderness   Lungs:      Exp rhonchi and wheezes to auscultation bilaterally, respirations unlabored   Chest wall:    No tenderness or deformity   Heart:     Irregular rate and rhythm, S1 and S2 normal, no murmur, rub   or gallop   Abdomen:     Soft, non-tender, bowel sounds active   Extremities:   Extremities normal, atraumatic, no cyanosis or edema   Skin:   Skin color, texture, turgor normal, no rashes or lesions   Lymph nodes:   Cervical normal   Neurologic:   CNII-XII intact            Lab, Imaging and other studies: I have personally reviewed pertinent labs  CBC: No results found for: WBC, HGB, HCT, MCV, PLT, ADJUSTEDWBC, MCH, MCHC, RDW, MPV, NRBC  CMP:   Lab Results   Component Value Date    K 3 5 03/30/2021    CL 92 (L) 03/30/2021    CO2 36 (H) 03/30/2021    BUN 14 03/30/2021    CREATININE 0 84 03/30/2021    CALCIUM 9 2 03/30/2021    EGFR 63 03/30/2021         Results from last 7 days   Lab Units 03/30/21  0842 03/30/21  0454 03/29/21  1801  03/27/21  0825   POTASSIUM mmol/L 3 5 4 0 3 3*   < > 3 2*   CHLORIDE mmol/L 92* 92* 90*   < > 88*   CO2 mmol/L 36* 36* 35*   < > 34*   BUN mg/dL 14 14 18   < > 16   CREATININE mg/dL 0 84 0 91 1 07   < > 1 07   CALCIUM mg/dL 9 2 9 1 8 7   < > 9 6   ALK PHOS U/L  --   --   --   --  61   ALT U/L  --   --   --   --  27   AST U/L  --   --   --   --  37    < > = values in this interval not displayed           Phosphorus: No results found for: PHOS  Magnesium: No results found for: MG  Urinalysis: No results found for: COLORU, CLARITYU, SPECGRAV, PHUR, LEUKOCYTESUR, NITRITE, PROTEINUA, GLUCOSEU, KETONESU, BILIRUBINUR, BLOODU  Ionized Calcium: No results found for: CAION  Coagulation: No results found for: PT, INR, APTT  Troponin: No results found for: TROPONINI  ABG: No results found for: PHART, RRT8SHE, PO2ART, OMV0WUI, O1FMPORE, BEART, SOURCE  Radiology review:     IMAGING  No results found  Current Facility-Administered Medications   Medication Dose Route Frequency    acetaminophen (TYLENOL) tablet 650 mg  650 mg Oral Q6H PRN    allopurinol (ZYLOPRIM) tablet 200 mg  200 mg Oral Daily    amLODIPine (NORVASC) tablet 5 mg  5 mg Oral Daily    atenolol (TENORMIN) tablet 25 mg  25 mg Oral Daily    calcium carbonate (TUMS) chewable tablet 1,000 mg  1,000 mg Oral Daily PRN    fluticasone (FLONASE) 50 mcg/act nasal spray 1 spray  1 spray Each Nare Daily    furosemide (LASIX) tablet 40 mg  40 mg Oral BID (diuretic)    ondansetron (ZOFRAN) injection 4 mg  4 mg Intravenous Q6H PRN    polyethylene glycol (MIRALAX) packet 17 g  17 g Oral Daily PRN    pravastatin (PRAVACHOL) tablet 40 mg  40 mg Oral Daily    rivaroxaban (XARELTO) tablet 20 mg  20 mg Oral Daily     Medications Discontinued During This Encounter   Medication Reason    rivaroxaban (XARELTO) tablet 15 mg     sodium chloride 0 9 % infusion     sodium chloride tablet 1 g     furosemide (LASIX) tablet 40 mg     brinzolamide (AZOPT) 1 % ophthalmic suspension 1 drop        Mary Holley MD      This progress note was produced in part using a dictation device which may document imprecise wording from author's original intent

## 2021-03-30 NOTE — ASSESSMENT & PLAN NOTE
Patient has acute respiratory failure with hypoxia  Currently requiring 2 L of oxygen to maintain pulse ox of more than 92%  Does not use any oxygen at home  Acute respiratory failure is probably secondary to atelectasis and obesity hypoventilation syndrome    No evidence of large effusions or pneumonia noted on CT chest   Unlikely pulmonary embolism as patient is already on anticoagulation  Restarted Lasix

## 2021-03-30 NOTE — CASE MANAGEMENT
CM reviewed home O2 study, Pt qualified for 2L NC at home  CM made referral via [facility], sent study and will need Rx from [facility] also received f/u from [facility], [facility] could accept for PT/OT however may need SN 2* new O2  [facility] can accept w/ PT/OT and SN to start Friday  [facility] does not have [facility] until 4/5  Will need to discuss w/ team to confirm dcp and f/u w/ [facility]

## 2021-03-30 NOTE — PLAN OF CARE
Problem: Potential for Falls  Goal: Patient will remain free of falls  Description: INTERVENTIONS:  - Assess patient frequently for physical needs  -  Identify cognitive and physical deficits and behaviors that affect risk of falls    -  [place] fall precautions as indicated by assessment   - Educate patient/family on patient safety including physical limitations  - Instruct patient to call for assistance with activity based on assessment  - Modify environment to reduce risk of injury  - Consider OT/PT consult to assist with strengthening/mobility  Outcome: Progressing     Problem: PAIN - ADULT  Goal: Verbalizes/displays adequate comfort level or baseline comfort level  Description: Interventions:  - Encourage patient to monitor pain and request assistance  - Assess pain using appropriate pain scale  - Administer analgesics based on type and severity of pain and evaluate response  - Implement non-pharmacological measures as appropriate and evaluate response  - Consider cultural and social influences on pain and pain management  - Notify physician/advanced practitioner if interventions unsuccessful or patient reports new pain  Outcome: Progressing     Problem: INFECTION - ADULT  Goal: Absence or prevention of progression during hospitalization  Description: INTERVENTIONS:  - Assess and monitor for signs and symptoms of infection  - Monitor lab/diagnostic results  - Monitor all insertion sites, i e  indwelling lines, tubes, and drains  - Monitor endotracheal if appropriate and nasal secretions for changes in amount and color  - [place] appropriate cooling/warming therapies per order  - Administer medications as ordered  - Instruct and encourage patient and family to use good hand hygiene technique  - Identify and instruct in appropriate isolation precautions for identified infection/condition  Outcome: Progressing     Problem: SAFETY ADULT  Goal: Patient will remain free of falls  Description: INTERVENTIONS:  - Assess patient frequently for physical needs  -  Identify cognitive and physical deficits and behaviors that affect risk of falls    -  [place] fall precautions as indicated by assessment   - Educate patient/family on patient safety including physical limitations  - Instruct patient to call for assistance with activity based on assessment  - Modify environment to reduce risk of injury  - Consider OT/PT consult to assist with strengthening/mobility  Outcome: Progressing  Goal: Maintain or return to baseline ADL function  Description: INTERVENTIONS:  -  Assess patient's ability to carry out ADLs; assess patient's baseline for ADL function and identify physical deficits which impact ability to perform ADLs (bathing, care of mouth/teeth, toileting, grooming, dressing, etc )  - Assess/evaluate cause of self-care deficits   - Assess range of motion  - Assess patient's mobility; develop plan if impaired  - Assess patient's need for assistive devices and provide as appropriate  - Encourage maximum independence but intervene and supervise when necessary  - Involve family in performance of ADLs  - Assess for home care needs following discharge   - Consider OT consult to assist with ADL evaluation and planning for discharge  - Provide patient education as appropriate  Outcome: Progressing  Goal: Maintain or return mobility status to optimal level  Description: INTERVENTIONS:  - Assess patient's baseline mobility status (ambulation, transfers, stairs, etc )    - Identify cognitive and physical deficits and behaviors that affect mobility  - Identify mobility aids required to assist with transfers and/or ambulation (gait belt, sit-to-stand, lift, walker, cane, etc )  - [place] fall precautions as indicated by assessment  - Record patient progress and toleration of activity level on Mobility SBAR; progress patient to next Phase/Stage  - Instruct patient to call for assistance with activity based on assessment  - Consider rehabilitation consult to assist with strengthening/weightbearing, etc   Outcome: Progressing     Problem: DISCHARGE PLANNING  Goal: Discharge to home or other facility with appropriate resources  Description: INTERVENTIONS:  - Identify barriers to discharge w/patient and caregiver  - Arrange for needed discharge resources and transportation as appropriate  - Identify discharge learning needs (meds, wound care, etc )  - Arrange for interpretive services to assist at discharge as needed  - Refer to Case Management Department for coordinating discharge planning if the patient needs post-hospital services based on physician/advanced practitioner order or complex needs related to functional status, cognitive ability, or social support system  Outcome: Progressing     Problem: Knowledge Deficit  Goal: Patient/family/caregiver demonstrates understanding of disease process, treatment plan, medications, and discharge instructions  Description: Complete learning assessment and assess knowledge base    Interventions:  - Provide teaching at level of understanding  - Provide teaching via preferred learning methods  Outcome: Progressing

## 2021-03-30 NOTE — PROGRESS NOTES
[address]  Progress Note Stacey Timmons 1933, 80 y o  female MRN: [id number]  Unit/Bed#: -01 Encounter: [id number]  Primary Care Provider: Mya Kohler DO   Date and time admitted to hospital: 3/27/2021  8:02 AM    Paroxysmal A-fib [facility])  Assessment & Plan  Currently rate controlled  Continue to atenolol for rate control and Xarelto for anticoagulation  CTA chest shows katheryn cava  No need for venous Dopplers to be done as patient is already on anticoagulation    Hyponatremia  Assessment & Plan  Patient appears to have hypovolemic hyponatremia at this time  She was recently increased on her diuretics and metolazone was added  She appears to have inadequate oral intake  reviewed urine osmolarity, serum osmolarity and serum sodium and appears consistent with SIADH  Sodium improved to 127  Correct serum sodium by 8 mEq 24 hours  Baseline sodium is 135-138  Appreciate input from Nephrology  Normal TSH  CT chest does not show any evidence of malignancy  Continue p o  Lasix 40 mg oral twice daily from tomorrow and monitor sodium levels q 12  Also placed on fluid restriction of 1200 cc/24 hrs    * Acute respiratory failure with hypoxia [facility])  Assessment & Plan  Patient has acute respiratory failure with hypoxia  Currently requiring 2 L of oxygen to maintain pulse ox of more than 92%  Does not use any oxygen at home  Acute respiratory failure is probably secondary to atelectasis and obesity hypoventilation syndrome  No evidence of large effusions or pneumonia noted on CT chest   Unlikely pulmonary embolism as patient is already on anticoagulation  Restarted Lasix    Other secondary gout, multiple sites  Assessment & Plan  Stable at this time  Not in exacerbation  Continue allopurinol    Mixed hyperlipidemia  Assessment & Plan  Continue statin therapy    Essential hypertension  Assessment & Plan  Blood pressure is currently controlled    Will continue Norvasc and tenormin  Hold metolazone, Lotensin for now    Morbid obesity [facility])  Assessment & Plan  Will need counseling on diet exercise and lifestyle modification  BMI is 36 15        VTE Pharmacologic Prophylaxis:   Pharmacologic: Rivaroxaban (Xarelto)  Mechanical VTE Prophylaxis in Place: Yes    Patient Centered Rounds: I have performed bedside rounds with nursing staff today  Discussions with Specialists or Other Care Team Provider:  Nephrology note reviewed    Education and Discussions with Family / Patient:  Yes with patient    Time Spent for Care: 20 minutes  More than 50% of total time spent on counseling and coordination of care as described above  Current Length of Stay: 3 day(s)    Current Patient Status: Inpatient   Certification Statement: The patient will continue to require additional inpatient hospital stay due to Acute respiratory failure, hyponatremia    Discharge Plan / Estimated Discharge Date: To be determined      Code Status: Level 1 - Full Code      Subjective:   Seen and evaluated during the round  Patient denies any significant complaint    Objective:     Vitals:   Temp (24hrs), Av 6 °F (37 °C), Min:98 3 °F (36 8 °C), Max:99 1 °F (37 3 °C)    Temp:  [98 3 °F (36 8 °C)-99 1 °F (37 3 °C)] 98 4 °F (36 9 °C)  HR:  [52-64] 53  Resp:  [18-21] 21  BP: (113-133)/(52-56) 113/52  SpO2:  [93 %-96 %] 93 %  Body mass index is 34 12 kg/m²  Input and Output Summary (last 24 hours): Intake/Output Summary (Last 24 hours) at 3/30/2021 1448  Last data filed at 3/30/2021 1423  Gross per 24 hour   Intake 1560 ml   Output 1075 ml   Net 485 ml       Physical Exam:     Physical Exam  Vitals signs and nursing note reviewed  Exam conducted with a chaperone present  Constitutional:       Appearance: She is not diaphoretic  HENT:      Head: Normocephalic  Nose: No congestion  Mouth/Throat:      Mouth: Mucous membranes are moist       Pharynx: No oropharyngeal exudate     Eyes: General: No scleral icterus  Conjunctiva/sclera: Conjunctivae normal       Pupils: Pupils are equal, round, and reactive to light  Neck:      Musculoskeletal: Normal range of motion  Cardiovascular:      Rate and Rhythm: Normal rate  Heart sounds: No friction rub  No gallop  Pulmonary:      Effort: Pulmonary effort is normal  No respiratory distress  Breath sounds: No wheezing  Abdominal:      General: Abdomen is flat  Bowel sounds are normal  There is no distension  Palpations: There is no mass  Tenderness: There is no abdominal tenderness  Musculoskeletal: Normal range of motion  Right lower leg: No edema  Left lower leg: No edema  Lymphadenopathy:      Cervical: No cervical adenopathy  Skin:     General: Skin is warm  Neurological:      General: No focal deficit present  Mental Status: She is alert and oriented to person, place, and time  Additional Data:     Labs:    Results from last 7 days   Lab Units 03/28/21  0458 03/27/21  0825   WBC Thousand/uL 3 02* 3 41*   HEMOGLOBIN g/dL 11 0* 12 3   HEMATOCRIT % 33 6* 36 8   PLATELETS Thousands/uL 55* 60*   NEUTROS PCT %  --  76*   LYMPHS PCT %  --  14   MONOS PCT %  --  9   EOS PCT %  --  1     Results from last 7 days   Lab Units 03/30/21  0842  03/27/21  0825   POTASSIUM mmol/L 3 5   < > 3 2*   CHLORIDE mmol/L 92*   < > 88*   CO2 mmol/L 36*   < > 34*   BUN mg/dL 14   < > 16   CREATININE mg/dL 0 84   < > 1 07   CALCIUM mg/dL 9 2   < > 9 6   ALK PHOS U/L  --   --  61   ALT U/L  --   --  27   AST U/L  --   --  37    < > = values in this interval not displayed  * I Have Reviewed All Lab Data Listed Above  * Additional Pertinent Lab Tests Reviewed:  All Labs Within Last 24 Hours Reviewed            Last 24 Hours Medication List:   Current Facility-Administered Medications   Medication Dose Route Frequency Provider Last Rate    acetaminophen  650 mg Oral Q6H PRN Camilo Rao MD      allopurinol 200 mg Oral Daily Santana Perez MD      amLODIPine  5 mg Oral Daily Santana Perez MD      atenolol  25 mg Oral Daily Santana Perez MD      calcium carbonate  1,000 mg Oral Daily PRN Santana Perez MD      fluticasone  1 spray Each Nare Daily Santana Perez MD      furosemide  40 mg Oral BID (diuretic) Santana Perez MD      ondansetron  4 mg Intravenous Q6H PRN Santana Perez MD      polyethylene glycol  17 g Oral Daily PRN Santana Perez MD      pravastatin  40 mg Oral Daily Santana Perez MD      rivaroxaban  20 mg Oral Daily Santana Perez MD          Today, Patient Was Seen By: Tommy Wasserman MD    ** Please Note: Dragon 360 Dictation voice to text software may have been used in the creation of this document   **

## 2021-03-31 VITALS
RESPIRATION RATE: 20 BRPM | DIASTOLIC BLOOD PRESSURE: 65 MMHG | OXYGEN SATURATION: 93 % | HEART RATE: 61 BPM | SYSTOLIC BLOOD PRESSURE: 134 MMHG | WEIGHT: 208.94 LBS | HEIGHT: 66 IN | TEMPERATURE: 98.4 F | BODY MASS INDEX: 33.58 KG/M2

## 2021-03-31 PROBLEM — E87.1 HYPONATREMIA: Status: RESOLVED | Noted: 2021-03-27 | Resolved: 2021-03-31

## 2021-03-31 LAB
ALBUMIN SERPL BCP-MCNC: 3.3 G/DL (ref 3.5–5)
ALP SERPL-CCNC: 53 U/L (ref 46–116)
ALT SERPL W P-5'-P-CCNC: 25 U/L (ref 12–78)
ANION GAP SERPL CALCULATED.3IONS-SCNC: 3 MMOL/L (ref 4–13)
ANION GAP SERPL CALCULATED.3IONS-SCNC: 4 MMOL/L (ref 4–13)
AST SERPL W P-5'-P-CCNC: 36 U/L (ref 5–45)
BASOPHILS # BLD AUTO: 0.01 THOUSANDS/ΜL (ref 0–0.1)
BASOPHILS NFR BLD AUTO: 0 % (ref 0–1)
BILIRUB SERPL-MCNC: 1.65 MG/DL (ref 0.2–1)
BUN SERPL-MCNC: 14 MG/DL (ref 5–25)
BUN SERPL-MCNC: 14 MG/DL (ref 5–25)
CALCIUM ALBUM COR SERPL-MCNC: 9.4 MG/DL (ref 8.3–10.1)
CALCIUM SERPL-MCNC: 8.8 MG/DL (ref 8.3–10.1)
CALCIUM SERPL-MCNC: 8.8 MG/DL (ref 8.3–10.1)
CHLORIDE SERPL-SCNC: 95 MMOL/L (ref 100–108)
CHLORIDE SERPL-SCNC: 95 MMOL/L (ref 100–108)
CO2 SERPL-SCNC: 36 MMOL/L (ref 21–32)
CO2 SERPL-SCNC: 37 MMOL/L (ref 21–32)
CREAT SERPL-MCNC: 0.91 MG/DL (ref 0.6–1.3)
CREAT SERPL-MCNC: 1 MG/DL (ref 0.6–1.3)
EOSINOPHIL # BLD AUTO: 0.12 THOUSAND/ΜL (ref 0–0.61)
EOSINOPHIL NFR BLD AUTO: 4 % (ref 0–6)
ERYTHROCYTE [DISTWIDTH] IN BLOOD BY AUTOMATED COUNT: 14.8 % (ref 11.6–15.1)
FLUAV RNA RESP QL NAA+PROBE: NEGATIVE
FLUBV RNA RESP QL NAA+PROBE: NEGATIVE
GFR SERPL CREATININE-BSD FRML MDRD: 51 ML/MIN/1.73SQ M
GFR SERPL CREATININE-BSD FRML MDRD: 57 ML/MIN/1.73SQ M
GLUCOSE SERPL-MCNC: 147 MG/DL (ref 65–140)
GLUCOSE SERPL-MCNC: 91 MG/DL (ref 65–140)
HCT VFR BLD AUTO: 35.1 % (ref 34.8–46.1)
HGB BLD-MCNC: 11.3 G/DL (ref 11.5–15.4)
IMM GRANULOCYTES # BLD AUTO: 0.01 THOUSAND/UL (ref 0–0.2)
IMM GRANULOCYTES NFR BLD AUTO: 0 % (ref 0–2)
LYMPHOCYTES # BLD AUTO: 0.72 THOUSANDS/ΜL (ref 0.6–4.47)
LYMPHOCYTES NFR BLD AUTO: 23 % (ref 14–44)
MCH RBC QN AUTO: 31.5 PG (ref 26.8–34.3)
MCHC RBC AUTO-ENTMCNC: 32.2 G/DL (ref 31.4–37.4)
MCV RBC AUTO: 98 FL (ref 82–98)
MONOCYTES # BLD AUTO: 0.39 THOUSAND/ΜL (ref 0.17–1.22)
MONOCYTES NFR BLD AUTO: 13 % (ref 4–12)
NEUTROPHILS # BLD AUTO: 1.86 THOUSANDS/ΜL (ref 1.85–7.62)
NEUTS SEG NFR BLD AUTO: 60 % (ref 43–75)
NRBC BLD AUTO-RTO: 0 /100 WBCS
PLATELET # BLD AUTO: 70 THOUSANDS/UL (ref 149–390)
PMV BLD AUTO: 10.5 FL (ref 8.9–12.7)
POTASSIUM SERPL-SCNC: 3.5 MMOL/L (ref 3.5–5.3)
POTASSIUM SERPL-SCNC: 3.7 MMOL/L (ref 3.5–5.3)
PROT SERPL-MCNC: 6.9 G/DL (ref 6.4–8.2)
RBC # BLD AUTO: 3.59 MILLION/UL (ref 3.81–5.12)
RSV RNA RESP QL NAA+PROBE: NEGATIVE
SARS-COV-2 RNA RESP QL NAA+PROBE: NEGATIVE
SODIUM SERPL-SCNC: 135 MMOL/L (ref 136–145)
SODIUM SERPL-SCNC: 135 MMOL/L (ref 136–145)
WBC # BLD AUTO: 3.11 THOUSAND/UL (ref 4.31–10.16)

## 2021-03-31 PROCEDURE — 80048 BASIC METABOLIC PNL TOTAL CA: CPT | Performed by: FAMILY MEDICINE

## 2021-03-31 PROCEDURE — 97530 THERAPEUTIC ACTIVITIES: CPT

## 2021-03-31 PROCEDURE — 99239 HOSP IP/OBS DSCHRG MGMT >30: CPT | Performed by: PHYSICIAN ASSISTANT

## 2021-03-31 PROCEDURE — 97110 THERAPEUTIC EXERCISES: CPT

## 2021-03-31 PROCEDURE — 85025 COMPLETE CBC W/AUTO DIFF WBC: CPT | Performed by: FAMILY MEDICINE

## 2021-03-31 PROCEDURE — 97116 GAIT TRAINING THERAPY: CPT

## 2021-03-31 PROCEDURE — 0241U HB NFCT DS VIR RESP RNA 4 TRGT: CPT | Performed by: PHYSICIAN ASSISTANT

## 2021-03-31 PROCEDURE — 80053 COMPREHEN METABOLIC PANEL: CPT | Performed by: FAMILY MEDICINE

## 2021-03-31 RX ADMIN — FUROSEMIDE 40 MG: 40 TABLET ORAL at 07:59

## 2021-03-31 RX ADMIN — ALLOPURINOL 200 MG: 100 TABLET ORAL at 08:00

## 2021-03-31 RX ADMIN — FLUTICASONE PROPIONATE 1 SPRAY: 50 SPRAY, METERED NASAL at 08:00

## 2021-03-31 RX ADMIN — AMLODIPINE BESYLATE 5 MG: 5 TABLET ORAL at 08:00

## 2021-03-31 RX ADMIN — RIVAROXABAN 20 MG: 20 TABLET, FILM COATED ORAL at 08:00

## 2021-03-31 RX ADMIN — PRAVASTATIN SODIUM 40 MG: 40 TABLET ORAL at 08:00

## 2021-03-31 RX ADMIN — ATENOLOL 25 MG: 25 TABLET ORAL at 08:00

## 2021-03-31 NOTE — PLAN OF CARE
Problem: PHYSICAL THERAPY ADULT  Goal: Performs mobility at highest level of function for planned discharge setting  See evaluation for individualized goals  Description: Treatment/Interventions: Functional transfer training, LE strengthening/ROM, Elevations, Therapeutic exercise, Endurance training, Patient/family training, Equipment eval/education, Bed mobility, Gait training, Compensatory technique education, Spoke to nursing, Spoke to case management, OT  Equipment Recommended: (walker-pt has)       See flowsheet documentation for full assessment, interventions and recommendations  Outcome: Progressing  Note: Prognosis: Good  Problem List: Decreased strength, Decreased endurance, Impaired balance, Decreased mobility, Decreased safety awareness, Obesity  Assessment: Pt seen for PT treatment session this date with interventions consisting of gait training w/ emphasis on improving pt's ability to ambulate level surfaces x 150  feet with close S provided by therapist with RW, Therapeutic exercise consisting of: AROM 20 reps B LE in sitting and supine position and therapeutic activity consisting of training: supine<>sit transfers, sit<>stand transfers and SPT/toilet transfer  Pt agreeable to PT treatment session upon arrival, pt found supine in bed, in no apparent distress  In comparison to previous session, pt with improvements in activity tolerance  Post session: pt returned back to recliner, chair alarm engaged and all needs in reach  Continue to recommend Home PT with family support at time of d/c in order to maximize pt's functional independence and safety w/ mobility  Pt continues to be functioning below baseline level, and remains limited 2* factors listed above and including decreased strength, endurance & safe functional mobility  PT will continue to see pt while here in order to address the deficits listed above and provide interventions consistent w/ POC in effort to achieve STGs    Barriers to Discharge: None     PT Discharge Recommendation: Home with skilled therapy, Return to previous environment with social support     PT - OK to Discharge: Yes(when med cleared)    See flowsheet documentation for full assessment

## 2021-03-31 NOTE — PHYSICAL THERAPY NOTE
03/31/21 0850   PT Last Visit   PT Visit Date 03/31/21   Note Type   Note Type Treatment   Pain Assessment   Pain Assessment Tool Pain Assessment not indicated - pt denies pain   Restrictions/Precautions   Weight Bearing Precautions Per Order No   Other Precautions Chair Alarm; Bed Alarm;Multiple lines;O2;Fall Risk;Hard of hearing  (2L O2)   General   Chart Reviewed Yes   Family/Caregiver Present No   Cognition   Overall Cognitive Status WFL   Arousal/Participation Alert; Cooperative   Attention Attends with cues to redirect   Orientation Level Oriented X4   Memory Within functional limits   Following Commands Follows one step commands without difficulty   Comments pt agreed to PT session-pleasant & cooperative   Subjective   Subjective "I feel good & hope to go home soon  I hope I don't need the O2 at home "   Bed Mobility   Supine to Sit 6  Modified independent   Additional items   (bed flat, did not use rails)   Additional Comments pt OOB in recliner to end session, all needs in reach, alarm on  Transfers   Sit to Stand 5  Supervision   Additional items Assist x 1; Increased time required;Verbal cues   Stand to Sit 5  Supervision   Additional items Increased time required;Verbal cues   Toilet transfer 5  Supervision   Additional items Assist x 1; Increased time required;Verbal cues;Standard toilet  (grab bar)   Additional Comments RW for all mobility, VCs for safety, technique   Ambulation/Elevation   Gait pattern Short stride; Excessively slow   Gait Assistance 5  Supervision   Additional items Assist x 1;Verbal cues   Assistive Device Rolling walker   Distance 150 feet   Stair Management Assistance Not tested   Balance   Static Sitting Good   Dynamic Sitting Fair +   Static Standing Fair   Dynamic Standing Fair   Ambulatory Fair   Endurance Deficit   Endurance Deficit Yes   Endurance Deficit Description on 2L O2-SPO2 92-96% with activity   Activity Tolerance   Activity Tolerance Patient limited by fatigue;Patient tolerated treatment well   Nurse Made Aware ok per RN for PT   Exercises   Quad Sets Supine;20 reps;AROM; Bilateral   Hip Flexion Sitting;20 reps;AROM; Bilateral   Hip Abduction Sitting;20 reps;AROM; Bilateral   Hip Adduction Sitting;20 reps;AROM; Bilateral  (isometric)   Knee AROM Long Arc Quad Sitting;20 reps;AROM; Bilateral   Ankle Pumps Supine;20 reps;AROM; Bilateral   Assessment   Prognosis Good   Problem List Decreased strength;Decreased endurance; Impaired balance;Decreased mobility; Decreased safety awareness; Obesity   Assessment Pt seen for PT treatment session this date with interventions consisting of gait training w/ emphasis on improving pt's ability to ambulate level surfaces x 150  feet with close S provided by therapist with RW, Therapeutic exercise consisting of: AROM 20 reps B LE in sitting and supine position and therapeutic activity consisting of training: supine<>sit transfers, sit<>stand transfers and SPT/toilet transfer  Pt agreeable to PT treatment session upon arrival, pt found supine in bed, in no apparent distress  In comparison to previous session, pt with improvements in activity tolerance  Post session: pt returned back to recliner, chair alarm engaged and all needs in reach  Continue to recommend Home PT with family support at time of d/c in order to maximize pt's functional independence and safety w/ mobility  Pt continues to be functioning below baseline level, and remains limited 2* factors listed above and including decreased strength, endurance & safe functional mobility  PT will continue to see pt while here in order to address the deficits listed above and provide interventions consistent w/ POC in effort to achieve STGs  Barriers to Discharge None   Goals   Patient Goals to go home soon   PT Treatment Day 2   Plan   Treatment/Interventions Functional transfer training;LE strengthening/ROM; Therapeutic exercise; Endurance training;Patient/family training;Equipment eval/education; Bed mobility;Gait training;Spoke to nursing   Progress Progressing toward goals   PT Frequency   (3-5 x week)   Recommendation   PT Discharge Recommendation Home with skilled therapy; Return to previous environment with social support   PT - OK to Discharge Yes  (when med cleared)   Kirsty 8 in Bed Without Bedrails 4   Lying on Back to Sitting on Edge of Flat Bed 4   Moving Bed to Chair 3   Standing Up From Chair 3   Walk in Room 3   Climb 3-5 Stairs 3   Basic Mobility Inpatient Raw Score 20   Basic Mobility Standardized Score 43 99   Jesús Wright, PTA

## 2021-03-31 NOTE — ASSESSMENT & PLAN NOTE
· Patient has acute respiratory failure with hypoxia  · Currently requiring 2 L of oxygen to maintain pulse ox of more than 92%  · Does not use any oxygen at home  · Acute respiratory failure is probably secondary to atelectasis and obesity hypoventilation syndrome  No evidence of large effusions or pneumonia noted on CT chest   Unlikely pulmonary embolism as patient is already on anticoagulation  · Restarted Lasix  · Wean oxygen as able to maintain sats >90%

## 2021-03-31 NOTE — DISCHARGE SUMMARY
[address]  Discharge- [facility] 1933, 80 y o  female MRN: [id number]  Unit/Bed#: -[place] Encounter: [id number]  Primary Care Provider: Lavinia Leonard DO   Date and time admitted to hospital: 3/27/2021  8:02 AM    * Acute respiratory failure with hypoxia [facility])  Assessment & Plan  · Patient has acute respiratory failure with hypoxia  · Currently requiring 2 L of oxygen to maintain pulse ox of more than 92%  · Does not use any oxygen at home  · Acute respiratory failure is probably secondary to atelectasis and obesity hypoventilation syndrome  No evidence of large effusions or pneumonia noted on CT chest   Unlikely pulmonary embolism as patient is already on anticoagulation  · Restarted Lasix  · Wean oxygen as able to maintain sats >90%  Paroxysmal A-fib (HCC)  Assessment & Plan  · Currently rate controlled on atenolol  · Anticoagulated on Xarelto  Essential hypertension  Assessment & Plan  · Stable on Norvasc and tenormin  · Lasix has been restarted  · Continue to hold metolazone  Morbid obesity ([address] )  Assessment & Plan  · Will need counseling on diet exercise and lifestyle modification  BMI is 36 15    Hyponatremia-resolved as of 3/31/2021  Assessment & Plan  · Likely secondary to SIADH since urine osmolarity, serum osmolarity and serum sodium and appears consistent with SIADH  · Sodium improved to 135 (corrects to 136)  · Baseline sodium is 135-138  · Nephrology following and appreciate their input  · Continue Lasix 40 mg BID and fluid restriction  · Normal TSH  CT chest does not show any evidence of malignancy        Discharging Physician / Practitioner: Jasmin Wells PA-C  PCP: Lavinia Leonard DO  Admission Date:   Admission Orders (From admission, onward)     Ordered        03/27/21 1211  Inpatient Admission  Once                   Discharge Date: 03/31/21    Resolved Problems  Date Reviewed: 3/31/2021          Resolved    Hyponatremia 3/31/2021     Resolved by  Maria Luisa Sullivan PA-C        Consultations During Hospital Stay:  · Nephrology    Procedures Performed:   · Chest x-ray (03/27/2021):  Central vascular congestion with no focal infiltrates  · CT chest abdomen pelvis (03/27/2021):  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  Diffuse atherosclerotic disease  Soft tissue edema will located below the knees bilaterally with superficial varicosities  Cardiac enlargement  Trace right pleural effusion  Significant Findings / Test Results:   · See above    Incidental Findings:   · Megacava     Test Results Pending at Discharge (will require follow up): · None     Outpatient Tests Requested:  · None    Complications:  Acute hypoxic respiratory failure    Reason for Admission:  Shortness of breath    Hospital Course:     Nagi Zamarripa is a 80 y o  female patient who originally presented to the hospital on 3/27/2021 due to shortness of breath  CXR demonstrated some mild volume overload  She was also found to be hyponatremic with mild JACOB  She was seen in consultation by nephrology  Labs suggestive of SIADH  She was managed with diuretics and fluid restriction with significant improvement in her electrolytes  Her JACOB resolved  She is recommended discharge with fluid restriction in place  She is still requiring oxygen so has been discharged with supplemental O2  This can be weaned as able to maintain sats >90%  She was evaluated by therapy who recommended discharge home with VNA  Please see above list of diagnoses and related plan for additional information  Condition at Discharge: stable     Discharge Day Visit / Exam:     Subjective: On the day of discharge the patient is feeling much better and ready to go home  She is still requiring oxygen however  Her lower extremity edema has resolved completely      Vitals: Blood Pressure: 134/65 (03/31/21 0718)  Pulse: 61 (03/31/21 0718)  Temperature: 98 4 °F (36 9 °C) (03/31/21 8318)  Temp Source: Oral (03/30/21 1444)  Respirations: 20 (03/31/21 0718)  Height: 5' 6" (167 6 cm) (03/27/21 4091)  Weight - Scale: 94 8 kg (208 lb 15 oz) (03/31/21 0600)  SpO2: 93 % (03/31/21 0718)  Exam:   Physical Exam  Vitals signs and nursing note reviewed  Constitutional:       General: She is not in acute distress  Appearance: She is well-developed  HENT:      Head: Normocephalic and atraumatic  Eyes:      Conjunctiva/sclera: Conjunctivae normal    Neck:      Musculoskeletal: Neck supple  Cardiovascular:      Rate and Rhythm: Normal rate and regular rhythm  Heart sounds: Murmur present  Pulmonary:      Effort: Pulmonary effort is normal  No respiratory distress  Breath sounds: Normal breath sounds  Abdominal:      Palpations: Abdomen is soft  Tenderness: There is no abdominal tenderness  Skin:     General: Skin is warm and dry  Neurological:      Mental Status: She is alert  Discussion with Family: Unsuccessful attempt to reach patient's son on the day of discharge  Discharge instructions/Information to patient and family:   See after visit summary for information provided to patient and family  Provisions for Follow-Up Care:  See after visit summary for information related to follow-up care and any pertinent home health orders  Disposition:     Home with VNA Services (Reminder: Complete face to face encounter)    For Discharges to [facility] SNF:   · Not Applicable to this Patient - Not Applicable to this Patient    Planned Readmission: No     Discharge Statement:  I spent 45 minutes discharging the patient  This time was spent on the day of discharge  I had direct contact with the patient on the day of discharge  Greater than 50% of the total time was spent examining patient, answering all patient questions, arranging and discussing plan of care with patient as well as directly providing post-discharge instructions    Additional time then spent on discharge activities  Discharge Medications:  See after visit summary for reconciled discharge medications provided to patient and family        ** Please Note: This note has been constructed using a voice recognition system **

## 2021-03-31 NOTE — CASE MANAGEMENT
Pt discussed in am rounds, plan for d/c today  CM received Rx for Home O2 w/ portability, sent to [facility]  [facility] delivered O2 to Pt's room, and have made arrangements for home concentrator delivery  CM confirmed [facility], PT/OT via [facility]  CM sent AVS to [facility]  Pt's son here for transport home, and Pt reports she was contacted by [employer] re: delivery

## 2021-03-31 NOTE — ASSESSMENT & PLAN NOTE
· Likely secondary to SIADH since urine osmolarity, serum osmolarity and serum sodium and appears consistent with SIADH  · Sodium improved to 135 (corrects to 136)  · Baseline sodium is 135-138  · Nephrology following and appreciate their input  · Continue Lasix 40 mg BID and fluid restriction  · Normal TSH  CT chest does not show any evidence of malignancy

## 2021-03-31 NOTE — PLAN OF CARE
Problem: Potential for Falls  Goal: Patient will remain free of falls  Description: INTERVENTIONS:  - Assess patient frequently for physical needs  -  Identify cognitive and physical deficits and behaviors that affect risk of falls    -  [place] fall precautions as indicated by assessment   - Educate patient/family on patient safety including physical limitations  - Instruct patient to call for assistance with activity based on assessment  - Modify environment to reduce risk of injury  - Consider OT/PT consult to assist with strengthening/mobility  Outcome: Progressing     Problem: PAIN - ADULT  Goal: Verbalizes/displays adequate comfort level or baseline comfort level  Description: Interventions:  - Encourage patient to monitor pain and request assistance  - Assess pain using appropriate pain scale  - Administer analgesics based on type and severity of pain and evaluate response  - Implement non-pharmacological measures as appropriate and evaluate response  - Consider cultural and social influences on pain and pain management  - Notify physician/advanced practitioner if interventions unsuccessful or patient reports new pain  Outcome: Progressing     Problem: INFECTION - ADULT  Goal: Absence or prevention of progression during hospitalization  Description: INTERVENTIONS:  - Assess and monitor for signs and symptoms of infection  - Monitor lab/diagnostic results  - Monitor all insertion sites, i e  indwelling lines, tubes, and drains  - Monitor endotracheal if appropriate and nasal secretions for changes in amount and color  - [place] appropriate cooling/warming therapies per order  - Administer medications as ordered  - Instruct and encourage patient and family to use good hand hygiene technique  - Identify and instruct in appropriate isolation precautions for identified infection/condition  Outcome: Progressing     Problem: SAFETY ADULT  Goal: Patient will remain free of falls  Description: INTERVENTIONS:  - Assess patient frequently for physical needs  -  Identify cognitive and physical deficits and behaviors that affect risk of falls    -  [place] fall precautions as indicated by assessment   - Educate patient/family on patient safety including physical limitations  - Instruct patient to call for assistance with activity based on assessment  - Modify environment to reduce risk of injury  - Consider OT/PT consult to assist with strengthening/mobility  Outcome: Progressing  Goal: Maintain or return to baseline ADL function  Description: INTERVENTIONS:  -  Assess patient's ability to carry out ADLs; assess patient's baseline for ADL function and identify physical deficits which impact ability to perform ADLs (bathing, care of mouth/teeth, toileting, grooming, dressing, etc )  - Assess/evaluate cause of self-care deficits   - Assess range of motion  - Assess patient's mobility; develop plan if impaired  - Assess patient's need for assistive devices and provide as appropriate  - Encourage maximum independence but intervene and supervise when necessary  - Involve family in performance of ADLs  - Assess for home care needs following discharge   - Consider OT consult to assist with ADL evaluation and planning for discharge  - Provide patient education as appropriate  Outcome: Progressing  Goal: Maintain or return mobility status to optimal level  Description: INTERVENTIONS:  - Assess patient's baseline mobility status (ambulation, transfers, stairs, etc )    - Identify cognitive and physical deficits and behaviors that affect mobility  - Identify mobility aids required to assist with transfers and/or ambulation (gait belt, sit-to-stand, lift, walker, cane, etc )  - [place] fall precautions as indicated by assessment  - Record patient progress and toleration of activity level on Mobility SBAR; progress patient to next Phase/Stage  - Instruct patient to call for assistance with activity based on assessment  - Consider rehabilitation consult to assist with strengthening/weightbearing, etc   Outcome: Progressing     Problem: DISCHARGE PLANNING  Goal: Discharge to home or other facility with appropriate resources  Description: INTERVENTIONS:  - Identify barriers to discharge w/patient and caregiver  - Arrange for needed discharge resources and transportation as appropriate  - Identify discharge learning needs (meds, wound care, etc )  - Arrange for interpretive services to assist at discharge as needed  - Refer to Case Management Department for coordinating discharge planning if the patient needs post-hospital services based on physician/advanced practitioner order or complex needs related to functional status, cognitive ability, or social support system  Outcome: Progressing     Problem: Knowledge Deficit  Goal: Patient/family/caregiver demonstrates understanding of disease process, treatment plan, medications, and discharge instructions  Description: Complete learning assessment and assess knowledge base    Interventions:  - Provide teaching at level of understanding  - Provide teaching via preferred learning methods  Outcome: Progressing

## 2021-04-01 NOTE — UTILIZATION REVIEW
Notification of Discharge  This is a Notification of Discharge from our facility [address]  Please be advised that this patient has been discharge from our facility  Below you will find the admission and discharge date and time including the patients disposition  PRESENTATION DATE: 3/27/2021  8:02 AM  OBS ADMISSION DATE:   IP ADMISSION DATE: 3/27/21 1211   DISCHARGE DATE: 3/31/2021  4:30 PM  DISPOSITION: [facility] with [facility]   Admission Orders listed below:  Admission Orders (From admission, onward)     Ordered        03/27/21 1211  Inpatient Admission  Once                   Please contact the UR Department if additional information is required to close this patient's authorization/case  [address] [facility] Utilization Review Department  Main: [phone] x carefully listen to the prompts  All voicemails are confidential   [email or web address]  Send all requests for admission clinical reviews, approved or denied determinations and any other requests to dedicated fax number below belonging to the campus where the patient is receiving treatment   List of dedicated fax numbers:  [address] DENIALS (Administrative/Medical Necessity) [phone]   [address] (Maternity/NICU/Pediatrics) [phone]   Sonny Devries [phone]   [facility] [phone]   Aneudy Henderson [phone]   Martine [address] [phone]   [facility]  [phone]   [address] [facility]  [address] [address] [address] [facility] [phone]

## 2024-06-04 ENCOUNTER — HOSPITAL ENCOUNTER (EMERGENCY)
Facility: HOSPITAL | Age: 89
Discharge: HOME | End: 2024-06-04
Attending: EMERGENCY MEDICINE
Payer: COMMERCIAL

## 2024-06-04 ENCOUNTER — APPOINTMENT (EMERGENCY)
Dept: CT IMAGING | Facility: HOSPITAL | Age: 89
End: 2024-06-04
Payer: COMMERCIAL

## 2024-06-04 VITALS
RESPIRATION RATE: 20 BRPM | HEART RATE: 65 BPM | WEIGHT: 197.75 LBS | SYSTOLIC BLOOD PRESSURE: 137 MMHG | BODY MASS INDEX: 31.92 KG/M2 | OXYGEN SATURATION: 97 % | DIASTOLIC BLOOD PRESSURE: 60 MMHG | TEMPERATURE: 98 F

## 2024-06-04 DIAGNOSIS — K76.6 PORTAL HYPERTENSION (HCC): ICD-10-CM

## 2024-06-04 DIAGNOSIS — K74.60 CIRRHOSIS (HCC): ICD-10-CM

## 2024-06-04 DIAGNOSIS — R10.9 ABDOMINAL PAIN: Primary | ICD-10-CM

## 2024-06-04 LAB
ALBUMIN SERPL BCP-MCNC: 4.2 G/DL (ref 3.5–5)
ALP SERPL-CCNC: 38 U/L (ref 34–104)
ALT SERPL W P-5'-P-CCNC: 15 U/L (ref 7–52)
ANION GAP SERPL CALCULATED.3IONS-SCNC: 6 MMOL/L (ref 4–13)
AST SERPL W P-5'-P-CCNC: 31 U/L (ref 13–39)
BASOPHILS # BLD AUTO: 0.02 THOUSANDS/ÂΜL (ref 0–0.1)
BASOPHILS NFR BLD AUTO: 1 % (ref 0–1)
BILIRUB SERPL-MCNC: 0.7 MG/DL (ref 0.2–1)
BILIRUB UR QL STRIP: NEGATIVE
BNP SERPL-MCNC: 257 PG/ML (ref 0–100)
BUN SERPL-MCNC: 27 MG/DL (ref 5–25)
CALCIUM SERPL-MCNC: 10.3 MG/DL (ref 8.4–10.2)
CARDIAC TROPONIN I PNL SERPL HS: 21 NG/L (ref 8–18)
CHLORIDE SERPL-SCNC: 98 MMOL/L (ref 96–108)
CLARITY UR: CLEAR
CO2 SERPL-SCNC: 35 MMOL/L (ref 21–32)
COLOR UR: YELLOW
CREAT SERPL-MCNC: 1.02 MG/DL (ref 0.6–1.3)
EOSINOPHIL # BLD AUTO: 0.08 THOUSAND/ÂΜL (ref 0–0.61)
EOSINOPHIL NFR BLD AUTO: 2 % (ref 0–6)
ERYTHROCYTE [DISTWIDTH] IN BLOOD BY AUTOMATED COUNT: 15.3 % (ref 11.6–15.1)
GFR SERPL CREATININE-BSD FRML MDRD: 48 ML/MIN/1.73SQ M
GLUCOSE SERPL-MCNC: 101 MG/DL (ref 65–140)
GLUCOSE UR STRIP-MCNC: NEGATIVE MG/DL
HCT VFR BLD AUTO: 32.6 % (ref 34.8–46.1)
HGB BLD-MCNC: 10.1 G/DL (ref 11.5–15.4)
HGB UR QL STRIP.AUTO: NEGATIVE
IMM GRANULOCYTES # BLD AUTO: 0.01 THOUSAND/UL (ref 0–0.2)
IMM GRANULOCYTES NFR BLD AUTO: 0 % (ref 0–2)
KETONES UR STRIP-MCNC: NEGATIVE MG/DL
LACTATE SERPL-SCNC: 0.8 MMOL/L (ref 0.5–2)
LEUKOCYTE ESTERASE UR QL STRIP: NEGATIVE
LIPASE SERPL-CCNC: 97 U/L (ref 11–82)
LYMPHOCYTES # BLD AUTO: 0.83 THOUSANDS/ÂΜL (ref 0.6–4.47)
LYMPHOCYTES NFR BLD AUTO: 21 % (ref 14–44)
MCH RBC QN AUTO: 31.2 PG (ref 26.8–34.3)
MCHC RBC AUTO-ENTMCNC: 31 G/DL (ref 31.4–37.4)
MCV RBC AUTO: 101 FL (ref 82–98)
MONOCYTES # BLD AUTO: 0.32 THOUSAND/ÂΜL (ref 0.17–1.22)
MONOCYTES NFR BLD AUTO: 8 % (ref 4–12)
NEUTROPHILS # BLD AUTO: 2.62 THOUSANDS/ÂΜL (ref 1.85–7.62)
NEUTS SEG NFR BLD AUTO: 68 % (ref 43–75)
NITRITE UR QL STRIP: NEGATIVE
NRBC BLD AUTO-RTO: 0 /100 WBCS
PH UR STRIP.AUTO: 6.5 [PH]
PLATELET # BLD AUTO: 95 THOUSANDS/UL (ref 149–390)
PMV BLD AUTO: 11.3 FL (ref 8.9–12.7)
POTASSIUM SERPL-SCNC: 4.6 MMOL/L (ref 3.5–5.3)
PROT SERPL-MCNC: 7.6 G/DL (ref 6.4–8.4)
PROT UR STRIP-MCNC: NEGATIVE MG/DL
QRS AXIS: 130 DEGREES
QRSD INTERVAL: 86 MS
QT INTERVAL: 396 MS
QTC INTERVAL: 456 MS
RBC # BLD AUTO: 3.24 MILLION/UL (ref 3.81–5.12)
SODIUM SERPL-SCNC: 139 MMOL/L (ref 135–147)
SP GR UR STRIP.AUTO: 1.01 (ref 1–1.03)
T WAVE AXIS: -34 DEGREES
UROBILINOGEN UR QL STRIP.AUTO: 0.2 E.U./DL
VENTRICULAR RATE: 80 BPM
WBC # BLD AUTO: 3.88 THOUSAND/UL (ref 4.31–10.16)

## 2024-06-04 PROCEDURE — 80053 COMPREHEN METABOLIC PANEL: CPT | Performed by: EMERGENCY MEDICINE

## 2024-06-04 PROCEDURE — 93005 ELECTROCARDIOGRAM TRACING: CPT

## 2024-06-04 PROCEDURE — 85025 COMPLETE CBC W/AUTO DIFF WBC: CPT | Performed by: EMERGENCY MEDICINE

## 2024-06-04 PROCEDURE — 36415 COLL VENOUS BLD VENIPUNCTURE: CPT | Performed by: EMERGENCY MEDICINE

## 2024-06-04 PROCEDURE — 83880 ASSAY OF NATRIURETIC PEPTIDE: CPT | Performed by: EMERGENCY MEDICINE

## 2024-06-04 PROCEDURE — 83605 ASSAY OF LACTIC ACID: CPT | Performed by: EMERGENCY MEDICINE

## 2024-06-04 PROCEDURE — 83690 ASSAY OF LIPASE: CPT | Performed by: EMERGENCY MEDICINE

## 2024-06-04 PROCEDURE — 99285 EMERGENCY DEPT VISIT HI MDM: CPT | Performed by: EMERGENCY MEDICINE

## 2024-06-04 PROCEDURE — 93010 ELECTROCARDIOGRAM REPORT: CPT | Performed by: INTERNAL MEDICINE

## 2024-06-04 PROCEDURE — 84484 ASSAY OF TROPONIN QUANT: CPT | Performed by: EMERGENCY MEDICINE

## 2024-06-04 PROCEDURE — 81003 URINALYSIS AUTO W/O SCOPE: CPT | Performed by: EMERGENCY MEDICINE

## 2024-06-04 PROCEDURE — 99285 EMERGENCY DEPT VISIT HI MDM: CPT

## 2024-06-04 PROCEDURE — 74176 CT ABD & PELVIS W/O CONTRAST: CPT

## 2024-06-04 NOTE — ED NOTES
Patient transferred to [facility] for urine sample. Steady gate at this time.     Allison A Schoener, RN  06/04/24 5233

## 2024-06-04 NOTE — ED NOTES
Provider at bedside.     Allison A Schoener, RN  06/04/24 4179     Detail Level: Detailed Reason?: non-covered service Payment Option: Option 1: Bill Medicare, await for decision on payment. Cost Of Treatment Patient Responsible For Paying?: Medicare allowed amount Reason?: Additional Information Procedure (Limit To 20 Characters): LN2

## 2024-06-04 NOTE — ED PROVIDER NOTES
History  Chief Complaint   Patient presents with    Abdominal Pain     C/o upper abdominal pain/SOB intermittently x2-3 weeks. Hx of CHF. Chronically on 2 L NC     This is a 89-year-old female presenting to the ED for evaluation of upper abdominal pain and shortness of breath for 2 to 3 weeks.  She is accompanied by her daughter who states that the pain is localized to the upper abdomen and nonradiating.  At the time of my evaluation patient states that her pain is 2 out of 10.  She intermittently has leg edema however states that it is not severe today.        Prior to Admission Medications   Prescriptions Last Dose Informant Patient Reported? Taking?   allopurinol (ZYLOPRIM) 100 mg tablet   Yes No   Sig: TAKE TWO TABLETS BY MOUTH DAILY   amLODIPine (NORVASC) 5 mg tablet   Yes No   Sig: Take 5 mg by mouth daily   atenolol (TENORMIN) 25 mg tablet   Yes No   Sig: Take 25 mg by mouth daily    benazepril (LOTENSIN) 10 mg tablet   Yes No   Sig: TAKE ONE TABLET BY MOUTH DAILY.   brinzolamide (Azopt) 1 % ophthalmic suspension   Yes No   fluticasone (FLONASE) 50 mcg/act nasal spray   Yes No   Si spray into each nostril daily    furosemide (LASIX) 40 mg tablet   Yes No   Sig: Take 40 mg by mouth 2 (two) times a day    meclizine (ANTIVERT) 25 mg tablet   Yes No   Sig: Take by mouth   pravastatin (PRAVACHOL) 40 mg tablet   Yes No   Sig: TAKE ONE TABLET BY MOUTH DAILY.   rivaroxaban (Xarelto) 20 mg tablet   Yes No   Sig: Take 20 mg by mouth daily with breakfast       Facility-Administered Medications: None       Past Medical History:   Diagnosis Date    A-fib (HCC)     Coronary artery disease     Dizziness     Edema     GERD (gastroesophageal reflux disease)     High cholesterol     Hypertension        Past Surgical History:   Procedure Laterality Date    CHOLECYSTECTOMY OPEN         Family History   Problem Relation Age of Onset    Hypertension Father      I have reviewed and agree with the history as  documented.    E-Cigarette/Vaping    E-Cigarette Use Never User      E-Cigarette/Vaping Substances     Social History     Tobacco Use    Smoking status: Never    Smokeless tobacco: Never   Vaping Use    Vaping status: Never Used   Substance Use Topics    Alcohol use: Not Currently    Drug use: Not Currently       Review of Systems   Constitutional:  Negative for chills and fever.   HENT:  Negative for ear pain and sore throat.    Eyes:  Negative for pain and visual disturbance.   Respiratory:  Negative for cough and shortness of breath.    Cardiovascular:  Negative for chest pain and palpitations.   Gastrointestinal:  Positive for abdominal pain and nausea. Negative for vomiting.   Genitourinary:  Negative for dysuria and hematuria.   Musculoskeletal:  Negative for arthralgias and back pain.   Skin:  Negative for color change and rash.   Neurological:  Negative for seizures and syncope.   All other systems reviewed and are negative.      Physical Exam  Physical Exam  Vitals and nursing note reviewed.   Constitutional:       General: She is not in acute distress.     Appearance: She is well-developed.   HENT:      Head: Normocephalic and atraumatic.      Mouth/Throat:      Mouth: Mucous membranes are moist.   Eyes:      Conjunctiva/sclera: Conjunctivae normal.      Pupils: Pupils are equal, round, and reactive to light.   Cardiovascular:      Rate and Rhythm: Normal rate and regular rhythm.      Heart sounds: No murmur heard.  Pulmonary:      Effort: Pulmonary effort is normal. No respiratory distress.      Breath sounds: Normal breath sounds.   Abdominal:      General: Abdomen is flat. Bowel sounds are normal. There is no distension or abdominal bruit.      Palpations: Abdomen is soft.      Tenderness: There is generalized abdominal tenderness.      Hernia: No hernia is present.   Musculoskeletal:         General: No swelling.      Cervical back: Neck supple.   Skin:     General: Skin is warm and dry.      Capillary  Refill: Capillary refill takes less than 2 seconds.   Neurological:      Mental Status: She is alert.   Psychiatric:         Mood and Affect: Mood normal.         Vital Signs  ED Triage Vitals [06/04/24 1500]   Temperature Pulse Respirations Blood Pressure SpO2   98 °F (36.7 °C) 77 22 105/51 (!) 88 %      Temp Source Heart Rate Source Patient Position - Orthostatic VS BP Location FiO2 (%)   Oral Monitor Lying Right arm --      Pain Score       No Pain           Vitals:    06/04/24 1700 06/04/24 1800 06/04/24 1845 06/04/24 1915   BP: 129/60 132/59 113/54 137/60   Pulse: 69 65 65 65   Patient Position - Orthostatic VS: Sitting Sitting           Visual Acuity      ED Medications  Medications - No data to display    Diagnostic Studies  Results Reviewed       Procedure Component Value Units Date/Time    High Sensitivity Troponin I Random [[id number]]  (Abnormal) Collected: 06/04/24 1519    Lab Status: Final result Specimen: Blood from Arm, Left Updated: 06/04/24 1650     HS TnI random 21 ng/L     B-Type Natriuretic Peptide(BNP) [[id number]]  (Abnormal) Collected: 06/04/24 1519    Lab Status: Final result Specimen: Blood from Arm, Left Updated: 06/04/24 1646      pg/mL     CMP [[id number]]  (Abnormal) Collected: 06/04/24 1519    Lab Status: Final result Specimen: Blood from Arm, Left Updated: 06/04/24 1557     Sodium 139 mmol/L      Potassium 4.6 mmol/L      Chloride 98 mmol/L      CO2 35 mmol/L      ANION GAP 6 mmol/L      BUN 27 mg/dL      Creatinine 1.02 mg/dL      Glucose 101 mg/dL      Calcium 10.3 mg/dL      AST 31 U/L      ALT 15 U/L      Alkaline Phosphatase 38 U/L      Total Protein 7.6 g/dL      Albumin 4.2 g/dL      Total Bilirubin 0.70 mg/dL      eGFR 48 ml/min/1.73sq m     Narrative:      National Kidney Disease Foundation guidelines for Chronic Kidney Disease (CKD):     Stage 1 with normal or high GFR (GFR > 90 mL/min/1.73 square meters)    Stage 2 Mild CKD (GFR = 60-89 mL/min/1.73 square meters)     Stage 3A Moderate CKD (GFR = 45-59 mL/min/1.73 square meters)    Stage 3B Moderate CKD (GFR = 30-44 mL/min/1.73 square meters)    Stage 4 Severe CKD (GFR = 15-29 mL/min/1.73 square meters)    Stage 5 End Stage CKD (GFR <15 mL/min/1.73 square meters)  Note: GFR calculation is accurate only with a steady state creatinine    Lipase [[id number]]  (Abnormal) Collected: 06/04/24 1519    Lab Status: Final result Specimen: Blood from Arm, Left Updated: 06/04/24 1557     Lipase 97 u/L     Lactic acid, plasma (w/reflex if result > 2.0) [[id number]]  (Normal) Collected: 06/04/24 1519    Lab Status: Final result Specimen: Blood from Arm, Left Updated: 06/04/24 1554     LACTIC ACID 0.8 mmol/L     Narrative:      Result may be elevated if tourniquet was used during collection.    UA w Reflex to Microscopic w Reflex to Culture [[id number]] Collected: 06/04/24 1543    Lab Status: Final result Specimen: Urine, Other Updated: 06/04/24 1551     Color, UA Yellow     Clarity, UA Clear     Specific Gravity, UA 1.015     pH, UA 6.5     Leukocytes, UA Negative     Nitrite, UA Negative     Protein, UA Negative mg/dl      Glucose, UA Negative mg/dl      Ketones, UA Negative mg/dl      Urobilinogen, UA 0.2 E.U./dl      Bilirubin, UA Negative     Occult Blood, UA Negative    CBC and differential [[id number]]  (Abnormal) Collected: 06/04/24 1519    Lab Status: Final result Specimen: Blood from Arm, Left Updated: 06/04/24 1543     WBC 3.88 Thousand/uL      RBC 3.24 Million/uL      Hemoglobin 10.1 g/dL      Hematocrit 32.6 %       fL      MCH 31.2 pg      MCHC 31.0 g/dL      RDW 15.3 %      MPV 11.3 fL      Platelets 95 Thousands/uL      nRBC 0 /100 WBCs      Segmented % 68 %      Immature Grans % 0 %      Lymphocytes % 21 %      Monocytes % 8 %      Eosinophils Relative 2 %      Basophils Relative 1 %      Absolute Neutrophils 2.62 Thousands/µL      Absolute Immature Grans 0.01 Thousand/uL      Absolute Lymphocytes 0.83 Thousands/µL       Absolute Monocytes 0.32 Thousand/µL      Eosinophils Absolute 0.08 Thousand/µL      Basophils Absolute 0.02 Thousands/µL                    CT abdomen pelvis wo contrast   Final Result by Maulik Jordan MD (06/04 1656)      No acute findings.      Hepatic cirrhosis. Left upper quadrant collaterals/splenorenal shunt and potentially recanalized periumbilical vein indicate portal hypertension.            Workstation performed: [id number]                    Procedures  Procedures         ED Course  ED Course as of 06/05/24 0053   Tue Jun 04, 2024   1900 Patient has stable ED course.  She has no acute findings on her labs  I did discuss with the patient and her daughter that there is a new finding as compared to 2021 of cirrhosis and portal hypertension.  Patient is not hypoxic and not in any acute distress.  States that her pain is only 2 out of 10 and has been unchanged.  She is stable for outpatient follow-up.  Ambulatory referral to GI was placed.                               SBIRT 22yo+      Flowsheet Row Most Recent Value   Initial Alcohol Screen: US AUDIT-C     1. How often do you have a drink containing alcohol? 0 Filed at: 06/04/2024 1500   2. How many drinks containing alcohol do you have on a typical day you are drinking?  0 Filed at: 06/04/2024 1500   3b. FEMALE Any Age, or MALE 65+: How often do you have 4 or more drinks on one occassion? 0 Filed at: 06/04/2024 1500   Audit-C Score 0 Filed at: 06/04/2024 1500   [place]: How many times in the past year have you...    Used an illegal drug or used a prescription medication for non-medical reasons? Never Filed at: 06/04/2024 1500                      Medical Decision Making  This is a 89-year-old female presenting to the ED for evaluation of abdominal pain with shortness of breath for several weeks.  Differential diagnosis includes but not limited to: Acute cholecystitis, pancreatitis, CHF.    Amount and/or Complexity of Data Reviewed  Labs:  ordered.  Radiology: ordered.             Disposition  Final diagnoses:   Abdominal pain   Cirrhosis (HCC)   Portal hypertension (HCC)     Time reflects when diagnosis was documented in both MDM as applicable and the Disposition within this note       Time User Action Codes Description Comment    6/4/2024  7:07 PM Amna Aleman Add [R10.9] Abdominal pain     6/4/2024  7:07 PM Amna Aleman Add [K74.60] Cirrhosis (HCC)     6/4/2024  7:07 PM Amna Aleman Add [K76.6] Portal hypertension (HCC)           ED Disposition       ED Disposition   Discharge    Condition   Stable    Date/Time   Tue Jun 4, 2024 1907    Comment   Violetta Olson discharge to home/self care.                   Follow-up Information       Follow up With Specialties Details Why Contact Info    Astrid Raymundo MD Gastroenterology   [address] [facility] [address]  [phone]              Discharge Medication List as of 6/4/2024  7:20 PM        CONTINUE these medications which have NOT CHANGED    Details   allopurinol (ZYLOPRIM) 100 mg tablet TAKE TWO TABLETS BY MOUTH DAILY, Historical Med      amLODIPine (NORVASC) 5 mg tablet Take 5 mg by mouth daily, Starting Mon 2/15/2021, Historical Med      atenolol (TENORMIN) 25 mg tablet Take 25 mg by mouth daily , Starting Fri 3/19/2021, Historical Med      benazepril (LOTENSIN) 10 mg tablet TAKE ONE TABLET BY MOUTH DAILY., Historical Med      brinzolamide (Azopt) 1 % ophthalmic suspension Historical Med      fluticasone (FLONASE) 50 mcg/act nasal spray 1 spray into each nostril daily , Historical Med      furosemide (LASIX) 40 mg tablet Take 40 mg by mouth 2 (two) times a day , Starting Wed 12/9/2020, Historical Med      meclizine (ANTIVERT) 25 mg tablet Take by mouth, Starting Fri 3/19/2021, Historical Med      pravastatin (PRAVACHOL) 40 mg tablet TAKE ONE TABLET BY MOUTH DAILY., Historical Med      rivaroxaban (Xarelto) 20 mg tablet Take 20 mg by mouth daily with breakfast ,  Starting Mon 3/22/2021, Historical Med                 PDMP Review       None            ED Provider  Electronically Signed by             Amna Aleman,   06/05/24 0052

## 2024-06-06 ENCOUNTER — TELEPHONE (OUTPATIENT)
Age: 89
End: 2024-06-06

## 2024-06-06 NOTE — TELEPHONE ENCOUNTER
Pt's daughter Kanika called to schedule an appointment. Call was transferred to [place] office.

## 2024-07-10 ENCOUNTER — TELEPHONE (OUTPATIENT)
Age: 89
End: 2024-07-10

## 2024-07-10 NOTE — TELEPHONE ENCOUNTER
Pts daughter called and wants to know why we are contacting the pt. On 7/10/24 when the pt was seen in the ER on 6/4/24. I let the pt know that she called the office on 6/6/24 and was transferred to the [place] office and they make there own appts. I gave the daughter the number to there office

## 2024-09-07 ENCOUNTER — HOSPITAL ENCOUNTER (INPATIENT)
Facility: HOSPITAL | Age: 89
LOS: 2 days | Discharge: HOME | DRG: 291 | End: 2024-09-10
Attending: EMERGENCY MEDICINE | Admitting: FAMILY MEDICINE
Payer: COMMERCIAL

## 2024-09-07 ENCOUNTER — APPOINTMENT (EMERGENCY)
Dept: RADIOLOGY | Facility: HOSPITAL | Age: 89
DRG: 291 | End: 2024-09-07
Payer: COMMERCIAL

## 2024-09-07 DIAGNOSIS — I50.33 ACUTE ON CHRONIC DIASTOLIC (CONGESTIVE) HEART FAILURE (HCC): ICD-10-CM

## 2024-09-07 DIAGNOSIS — I50.9 ACUTE ON CHRONIC CONGESTIVE HEART FAILURE, UNSPECIFIED HEART FAILURE TYPE (HCC): Primary | ICD-10-CM

## 2024-09-07 PROBLEM — J96.11 CHRONIC RESPIRATORY FAILURE WITH HYPOXIA (HCC): Status: ACTIVE | Noted: 2024-09-07

## 2024-09-07 LAB
ALBUMIN SERPL BCG-MCNC: 4.4 G/DL (ref 3.5–5)
ALP SERPL-CCNC: 41 U/L (ref 34–104)
ALT SERPL W P-5'-P-CCNC: 15 U/L (ref 7–52)
ANION GAP SERPL CALCULATED.3IONS-SCNC: 7 MMOL/L (ref 4–13)
AST SERPL W P-5'-P-CCNC: 27 U/L (ref 13–39)
BACTERIA UR QL AUTO: NORMAL /HPF
BASOPHILS # BLD AUTO: 0.02 THOUSANDS/ÂΜL (ref 0–0.1)
BASOPHILS NFR BLD AUTO: 1 % (ref 0–1)
BILIRUB SERPL-MCNC: 0.66 MG/DL (ref 0.2–1)
BILIRUB UR QL STRIP: NEGATIVE
BNP SERPL-MCNC: 389 PG/ML (ref 0–100)
BUN SERPL-MCNC: 24 MG/DL (ref 5–25)
CALCIUM SERPL-MCNC: 10.5 MG/DL (ref 8.4–10.2)
CARDIAC TROPONIN I PNL SERPL HS: 28 NG/L
CHLORIDE SERPL-SCNC: 97 MMOL/L (ref 96–108)
CLARITY UR: CLEAR
CO2 SERPL-SCNC: 35 MMOL/L (ref 21–32)
COLOR UR: ABNORMAL
CREAT SERPL-MCNC: 1.07 MG/DL (ref 0.6–1.3)
D DIMER PPP FEU-MCNC: 2.16 UG/ML FEU
EOSINOPHIL # BLD AUTO: 0.09 THOUSAND/ÂΜL (ref 0–0.61)
EOSINOPHIL NFR BLD AUTO: 2 % (ref 0–6)
ERYTHROCYTE [DISTWIDTH] IN BLOOD BY AUTOMATED COUNT: 15.3 % (ref 11.6–15.1)
GFR SERPL CREATININE-BSD FRML MDRD: 45 ML/MIN/1.73SQ M
GLUCOSE SERPL-MCNC: 113 MG/DL (ref 65–140)
GLUCOSE UR STRIP-MCNC: NEGATIVE MG/DL
HCT VFR BLD AUTO: 31.4 % (ref 34.8–46.1)
HGB BLD-MCNC: 9.9 G/DL (ref 11.5–15.4)
HGB UR QL STRIP.AUTO: NEGATIVE
IMM GRANULOCYTES # BLD AUTO: 0.01 THOUSAND/UL (ref 0–0.2)
IMM GRANULOCYTES NFR BLD AUTO: 0 % (ref 0–2)
KETONES UR STRIP-MCNC: NEGATIVE MG/DL
LEUKOCYTE ESTERASE UR QL STRIP: ABNORMAL
LG PLATELETS BLD QL SMEAR: PRESENT
LYMPHOCYTES # BLD AUTO: 1.08 THOUSANDS/ÂΜL (ref 0.6–4.47)
LYMPHOCYTES NFR BLD AUTO: 29 % (ref 14–44)
MCH RBC QN AUTO: 31.5 PG (ref 26.8–34.3)
MCHC RBC AUTO-ENTMCNC: 31.5 G/DL (ref 31.4–37.4)
MCV RBC AUTO: 100 FL (ref 82–98)
MONOCYTES # BLD AUTO: 0.33 THOUSAND/ÂΜL (ref 0.17–1.22)
MONOCYTES NFR BLD AUTO: 9 % (ref 4–12)
NEUTROPHILS # BLD AUTO: 2.23 THOUSANDS/ÂΜL (ref 1.85–7.62)
NEUTS SEG NFR BLD AUTO: 59 % (ref 43–75)
NITRITE UR QL STRIP: NEGATIVE
NON-SQ EPI CELLS URNS QL MICRO: NORMAL /HPF
NRBC BLD AUTO-RTO: 0 /100 WBCS
PH UR STRIP.AUTO: 7 [PH]
PLATELET # BLD AUTO: 83 THOUSANDS/UL (ref 149–390)
PLATELET BLD QL SMEAR: ABNORMAL
PMV BLD AUTO: 11.3 FL (ref 8.9–12.7)
POTASSIUM SERPL-SCNC: 4.4 MMOL/L (ref 3.5–5.3)
PROT SERPL-MCNC: 7.6 G/DL (ref 6.4–8.4)
PROT UR STRIP-MCNC: NEGATIVE MG/DL
RBC # BLD AUTO: 3.14 MILLION/UL (ref 3.81–5.12)
RBC #/AREA URNS AUTO: NORMAL /HPF
SODIUM SERPL-SCNC: 139 MMOL/L (ref 135–147)
SP GR UR STRIP.AUTO: 1.01 (ref 1–1.03)
UROBILINOGEN UR QL STRIP.AUTO: 0.2 E.U./DL
WBC # BLD AUTO: 3.76 THOUSAND/UL (ref 4.31–10.16)
WBC #/AREA URNS AUTO: NORMAL /HPF

## 2024-09-07 PROCEDURE — 99285 EMERGENCY DEPT VISIT HI MDM: CPT

## 2024-09-07 PROCEDURE — 99285 EMERGENCY DEPT VISIT HI MDM: CPT | Performed by: EMERGENCY MEDICINE

## 2024-09-07 PROCEDURE — 83880 ASSAY OF NATRIURETIC PEPTIDE: CPT

## 2024-09-07 PROCEDURE — 85379 FIBRIN DEGRADATION QUANT: CPT | Performed by: EMERGENCY MEDICINE

## 2024-09-07 PROCEDURE — 36415 COLL VENOUS BLD VENIPUNCTURE: CPT

## 2024-09-07 PROCEDURE — 93005 ELECTROCARDIOGRAM TRACING: CPT

## 2024-09-07 PROCEDURE — 99222 1ST HOSP IP/OBS MODERATE 55: CPT | Performed by: FAMILY MEDICINE

## 2024-09-07 PROCEDURE — 84484 ASSAY OF TROPONIN QUANT: CPT

## 2024-09-07 PROCEDURE — 80053 COMPREHEN METABOLIC PANEL: CPT

## 2024-09-07 PROCEDURE — 81001 URINALYSIS AUTO W/SCOPE: CPT

## 2024-09-07 PROCEDURE — 85025 COMPLETE CBC W/AUTO DIFF WBC: CPT

## 2024-09-07 PROCEDURE — 96374 THER/PROPH/DIAG INJ IV PUSH: CPT

## 2024-09-07 PROCEDURE — 71045 X-RAY EXAM CHEST 1 VIEW: CPT

## 2024-09-07 RX ORDER — FUROSEMIDE 10 MG/ML
40 INJECTION INTRAMUSCULAR; INTRAVENOUS ONCE
Status: COMPLETED | OUTPATIENT
Start: 2024-09-07 | End: 2024-09-07

## 2024-09-07 RX ORDER — MECLIZINE HYDROCHLORIDE 25 MG/1
25 TABLET ORAL EVERY 8 HOURS SCHEDULED
Status: DISCONTINUED | OUTPATIENT
Start: 2024-09-07 | End: 2024-09-07

## 2024-09-07 RX ORDER — BRINZOLAMIDE 10 MG/ML
1 SUSPENSION/ DROPS OPHTHALMIC EVERY 8 HOURS SCHEDULED
Status: DISCONTINUED | OUTPATIENT
Start: 2024-09-07 | End: 2024-09-07

## 2024-09-07 RX ORDER — AMLODIPINE BESYLATE 5 MG/1
5 TABLET ORAL DAILY
Status: DISCONTINUED | OUTPATIENT
Start: 2024-09-08 | End: 2024-09-09

## 2024-09-07 RX ORDER — LISINOPRIL 10 MG/1
10 TABLET ORAL DAILY
Status: DISCONTINUED | OUTPATIENT
Start: 2024-09-08 | End: 2024-09-09

## 2024-09-07 RX ORDER — ASPIRIN 81 MG/1
81 TABLET, CHEWABLE ORAL DAILY
COMMUNITY

## 2024-09-07 RX ORDER — FUROSEMIDE 10 MG/ML
40 INJECTION INTRAMUSCULAR; INTRAVENOUS 2 TIMES DAILY
Status: DISCONTINUED | OUTPATIENT
Start: 2024-09-08 | End: 2024-09-09

## 2024-09-07 RX ORDER — PRAVASTATIN SODIUM 40 MG
40 TABLET ORAL DAILY
Status: DISCONTINUED | OUTPATIENT
Start: 2024-09-08 | End: 2024-09-07

## 2024-09-07 RX ORDER — ATENOLOL 25 MG/1
25 TABLET ORAL DAILY
Status: DISCONTINUED | OUTPATIENT
Start: 2024-09-08 | End: 2024-09-09

## 2024-09-07 RX ORDER — PRAVASTATIN SODIUM 40 MG
40 TABLET ORAL
Status: DISCONTINUED | OUTPATIENT
Start: 2024-09-07 | End: 2024-09-10 | Stop reason: HOSPADM

## 2024-09-07 RX ORDER — FLUTICASONE PROPIONATE 50 MCG
1 SPRAY, SUSPENSION (ML) NASAL DAILY
Status: DISCONTINUED | OUTPATIENT
Start: 2024-09-08 | End: 2024-09-10 | Stop reason: HOSPADM

## 2024-09-07 RX ORDER — MECLIZINE HYDROCHLORIDE 25 MG/1
25 TABLET ORAL EVERY 8 HOURS PRN
Status: DISCONTINUED | OUTPATIENT
Start: 2024-09-07 | End: 2024-09-10 | Stop reason: HOSPADM

## 2024-09-07 RX ORDER — ALLOPURINOL 100 MG/1
200 TABLET ORAL DAILY
Status: DISCONTINUED | OUTPATIENT
Start: 2024-09-08 | End: 2024-09-10 | Stop reason: HOSPADM

## 2024-09-07 RX ADMIN — FUROSEMIDE 40 MG: 10 INJECTION, SOLUTION INTRAMUSCULAR; INTRAVENOUS at 19:21

## 2024-09-07 RX ADMIN — PRAVASTATIN SODIUM 40 MG: 40 TABLET ORAL at 21:44

## 2024-09-07 NOTE — Clinical Note
Case was discussed with Dr. Velazquez and the patient's admission status was agreed to be Admission Status: inpatient status to the service of Dr. Velazquez.

## 2024-09-08 PROBLEM — K74.60 LIVER CIRRHOSIS (HCC): Status: ACTIVE | Noted: 2024-09-08

## 2024-09-08 PROBLEM — D61.818 PANCYTOPENIA (HCC): Status: ACTIVE | Noted: 2024-09-08

## 2024-09-08 LAB
ANION GAP SERPL CALCULATED.3IONS-SCNC: 4 MMOL/L (ref 4–13)
BUN SERPL-MCNC: 21 MG/DL (ref 5–25)
CALCIUM SERPL-MCNC: 9.4 MG/DL (ref 8.4–10.2)
CHLORIDE SERPL-SCNC: 99 MMOL/L (ref 96–108)
CO2 SERPL-SCNC: 38 MMOL/L (ref 21–32)
CREAT SERPL-MCNC: 0.95 MG/DL (ref 0.6–1.3)
ERYTHROCYTE [DISTWIDTH] IN BLOOD BY AUTOMATED COUNT: 15.5 % (ref 11.6–15.1)
GFR SERPL CREATININE-BSD FRML MDRD: 52 ML/MIN/1.73SQ M
GLUCOSE SERPL-MCNC: 93 MG/DL (ref 65–140)
HCT VFR BLD AUTO: 27.6 % (ref 34.8–46.1)
HGB BLD-MCNC: 8.6 G/DL (ref 11.5–15.4)
MAGNESIUM SERPL-MCNC: 1.9 MG/DL (ref 1.9–2.7)
MCH RBC QN AUTO: 31.2 PG (ref 26.8–34.3)
MCHC RBC AUTO-ENTMCNC: 31.2 G/DL (ref 31.4–37.4)
MCV RBC AUTO: 100 FL (ref 82–98)
PLATELET # BLD AUTO: 62 THOUSANDS/UL (ref 149–390)
PMV BLD AUTO: 10.4 FL (ref 8.9–12.7)
POTASSIUM SERPL-SCNC: 3.8 MMOL/L (ref 3.5–5.3)
RBC # BLD AUTO: 2.76 MILLION/UL (ref 3.81–5.12)
SODIUM SERPL-SCNC: 141 MMOL/L (ref 135–147)
WBC # BLD AUTO: 2.55 THOUSAND/UL (ref 4.31–10.16)

## 2024-09-08 PROCEDURE — 83735 ASSAY OF MAGNESIUM: CPT | Performed by: FAMILY MEDICINE

## 2024-09-08 PROCEDURE — 99232 SBSQ HOSP IP/OBS MODERATE 35: CPT | Performed by: FAMILY MEDICINE

## 2024-09-08 PROCEDURE — 85027 COMPLETE CBC AUTOMATED: CPT | Performed by: FAMILY MEDICINE

## 2024-09-08 PROCEDURE — 97535 SELF CARE MNGMENT TRAINING: CPT

## 2024-09-08 PROCEDURE — 80048 BASIC METABOLIC PNL TOTAL CA: CPT | Performed by: FAMILY MEDICINE

## 2024-09-08 PROCEDURE — 97166 OT EVAL MOD COMPLEX 45 MIN: CPT

## 2024-09-08 RX ORDER — ASPIRIN 81 MG/1
81 TABLET, CHEWABLE ORAL DAILY
Status: DISCONTINUED | OUTPATIENT
Start: 2024-09-08 | End: 2024-09-10 | Stop reason: HOSPADM

## 2024-09-08 RX ORDER — POTASSIUM CHLORIDE 750 MG/1
20 TABLET, EXTENDED RELEASE ORAL 3 TIMES DAILY
COMMUNITY
Start: 2024-07-22

## 2024-09-08 RX ADMIN — PRAVASTATIN SODIUM 40 MG: 40 TABLET ORAL at 20:48

## 2024-09-08 RX ADMIN — FUROSEMIDE 40 MG: 10 INJECTION, SOLUTION INTRAMUSCULAR; INTRAVENOUS at 17:08

## 2024-09-08 RX ADMIN — ALLOPURINOL 200 MG: 100 TABLET ORAL at 09:04

## 2024-09-08 RX ADMIN — LISINOPRIL 10 MG: 10 TABLET ORAL at 09:04

## 2024-09-08 RX ADMIN — ASPIRIN 81 MG 81 MG: 81 TABLET ORAL at 09:05

## 2024-09-08 RX ADMIN — AMLODIPINE BESYLATE 5 MG: 5 TABLET ORAL at 09:04

## 2024-09-08 RX ADMIN — ATENOLOL 25 MG: 25 TABLET ORAL at 09:04

## 2024-09-08 RX ADMIN — FUROSEMIDE 40 MG: 10 INJECTION, SOLUTION INTRAMUSCULAR; INTRAVENOUS at 09:05

## 2024-09-08 NOTE — H&P
[facility]  H&P  Name: Violetta Olson 89 y.o. female I MRN: [id number]  Unit/Bed#: -01 I Date of Admission: 9/7/2024   Date of Service: 9/7/2024 I Hospital Day: 0      Assessment & Plan   * Acute on chronic diastolic (congestive) heart failure (HCC)  Assessment & Plan  Wt Readings from Last 3 Encounters:   09/07/24 92.2 kg (203 lb 4.2 oz)   06/04/24 89.7 kg (197 lb 12 oz)   03/31/21 94.8 kg (208 lb 15 oz)     Patient presents today (9/7) with complaints of persistent/progressive shortness of breath as well as bilateral lower extremity edema over the past several days  Patient reports compliance with home medication regimen including Lasix twice daily-follows regularly with cardiology in the outpatient setting, [place] [facility]  Patient without complaints of chest pressure or palpitations    In ED, patient maintaining adequate saturation on chronic 2 L nasal cannula, without SIRS criteria, hemodynamically stable  Weight on presentation today is up from previous  Cardiac enzymes negative  Given weight gain and symptoms index suspicion for slight exacerbation of underlying diastolic heart failure prompting request for observation    Plan:  Observe under hospitalist service  Received 1 dose of intravenous loop diuretic therapy in the ED-monitor output over the next 12 hours  Proceed with IV diuretic therapy for dose tomorrow morning and assess for symptomatic improvement  It certainly possible the patient may attain euvolemic state within 24 to 48 hours  Daily potassium repletion  Telemetry over first 24 hours  Strict intake and output  Daily weights  Most recent echocardiogram on file from 2020-preserved ejection fraction with evidence of diastolic dysfunction-repeat if possible inpatient-if unavailable, referral/prescription for outpatient echocardiogram          Chronic respiratory failure with hypoxia (HCC)  Assessment & Plan  Patient chronically maintained on 2 L/min   Presents  with saturations in the mid 90s on chronic 2 L  Continuous pulse oximetry  Chronic condition/stable    Paroxysmal A-fib (HCC)  Assessment & Plan  Patient is status post Watchman  No longer maintained on systemic anticoagulation  Continue daily aspirin  Rate controlled-continue Tenormin 25 mg p.o. daily  Monitor on telemetry in the setting of volume unloading    Other secondary gout, multiple sites  Assessment & Plan  Chronic condition/stable    Essential hypertension  Assessment & Plan  Mildly elevated systolic blood pressure upon presentation consistent with presentation with volume overload  Improved over ED course after IV Lasix administration  Chronic condition/stable  Continue amlodipine and Tenormin    Morbid obesity (HCC)  Assessment & Plan  Recommend incorporating a more whole foods plant-predominant diet along with decreasing consumption of red meats and processed foods  Per AHA guidelines, recommend moderate-vigorous intensity exercise for 30 minutes a day for 5 days a week or a total of 150 min/week        VTE Prophylaxis: Enoxaparin (Lovenox)  / sequential compression device   Code Status: Full  POLST: There is no POLST form on file for this patient (pre-hospital)    Anticipated Length of Stay:  Patient will be admitted on an Observation basis with an anticipated length of stay of  < 2 midnights.   Justification for Hospital Stay: Dyspnea    Total Time for Visit, including Counseling / Coordination of Care: 45 minutes.  Greater than 50% of this total time spent on direct patient counseling and coordination of care.    Chief Complaint:   Bilateral lower extremity swelling/dyspnea    History of Present Illness:    Violetta Olson is a 89 y.o. female with past medical history significant for atrial fibrillation, status post watchman, diastolic congestive heart failure, hypertension, who presents today accompanied by her daughter with complaints of bilateral leg swelling with shortness of breath over the  past several days.  Notes history of diastolic heart failure-reports compliance with diuretic regimen in the outpatient setting.  Patient reports increased leg swelling over the past several days with shortness of breath on exertion.    In ED, cardiac enzymes negative without significant delta.  EKG without ischemic change.  Weight is up from previous.  Without oxygen requirement, hemodynamically stable.  Received IV diuretics in the ED with request for observation secondary to presenting symptoms.    Review of Systems:    Review of Systems   All other systems reviewed and are negative.      Past Medical and Surgical History:     Past Medical History:   Diagnosis Date    A-fib (HCC)     Coronary artery disease     Dizziness     Edema     GERD (gastroesophageal reflux disease)     High cholesterol     Hypertension        Past Surgical History:   Procedure Laterality Date    CHOLECYSTECTOMY OPEN         Meds/Allergies:    Prior to Admission medications    Medication Sig Start Date End Date Taking? Authorizing Provider   allopurinol (ZYLOPRIM) 100 mg tablet TAKE TWO TABLETS BY MOUTH DAILY 12/28/20  Yes Historical Provider, MD   aspirin 81 mg chewable tablet Chew 81 mg daily   Yes Historical Provider, MD   pravastatin (PRAVACHOL) 40 mg tablet TAKE ONE TABLET BY MOUTH DAILY. 3/26/21  Yes Historical Provider, MD   amLODIPine (NORVASC) 5 mg tablet Take 5 mg by mouth daily 2/15/21   Historical Provider, MD   atenolol (TENORMIN) 25 mg tablet Take 25 mg by mouth daily  3/19/21   Historical Provider, MD   benazepril (LOTENSIN) 10 mg tablet TAKE ONE TABLET BY MOUTH DAILY. 3/22/21   Historical Provider, MD   brinzolamide (Azopt) 1 % ophthalmic suspension     Historical Provider, MD   fluticasone (FLONASE) 50 mcg/act nasal spray 1 spray into each nostril daily     Historical Provider, MD   furosemide (LASIX) 40 mg tablet Take 40 mg by mouth 2 (two) times a day  12/9/20   Historical Provider, MD   meclizine (ANTIVERT) 25 mg tablet  "Take by mouth 3/19/21   Historical Provider, MD   rivaroxaban (Xarelto) 20 mg tablet Take 20 mg by mouth daily with breakfast   Patient not taking: Reported on 9/7/2024 3/22/21   Historical Provider, MD     I have reviewed home medications with a medical source (PCP, Pharmacy, other).    Allergies: No Known Allergies    Social History:     Marital Status:    Substance Use History:   Social History     Substance and Sexual Activity   Alcohol Use Not Currently     Social History     Tobacco Use   Smoking Status Never   Smokeless Tobacco Never     Social History     Substance and Sexual Activity   Drug Use Not Currently       Family History:    non-contributory    Physical Exam:     Vitals:   Blood Pressure: 119/82 (09/07/24 2059)  Pulse: 70 (09/07/24 2059)  Temperature: 98.1 °F (36.7 °C) (09/07/24 2059)  Temp Source: Temporal (09/07/24 2059)  Respirations: 17 (09/07/24 2059)  Height: 5' 6\" (167.6 cm) (09/07/24 1823)  Weight - Scale: 92.2 kg (203 lb 4.2 oz) (09/07/24 2059)  SpO2: 95 % (09/07/24 2059)    Physical Exam  Constitutional:       General: She is not in acute distress.     Appearance: Normal appearance. She is obese. She is not ill-appearing, toxic-appearing or diaphoretic.   HENT:      Head: Normocephalic and atraumatic.      Right Ear: External ear normal.      Left Ear: External ear normal.      Nose: Nose normal. No congestion.      Mouth/Throat:      Mouth: Mucous membranes are dry.      Pharynx: Oropharynx is clear.   Eyes:      General: No scleral icterus.     Conjunctiva/sclera: Conjunctivae normal.      Pupils: Pupils are equal, round, and reactive to light.   Cardiovascular:      Rate and Rhythm: Normal rate and regular rhythm.      Pulses: Normal pulses.      Heart sounds: Murmur heard.   Pulmonary:      Effort: Pulmonary effort is normal. No respiratory distress.      Breath sounds: Normal breath sounds.   Abdominal:      General: Abdomen is flat. Bowel sounds are normal. There is no " distension.      Palpations: Abdomen is soft.   Musculoskeletal:      Right lower leg: Edema present.      Left lower leg: Edema present.   Skin:     General: Skin is warm.      Capillary Refill: Capillary refill takes less than 2 seconds.   Neurological:      General: No focal deficit present.      Mental Status: She is alert and oriented to person, place, and time. Mental status is at baseline.      Cranial Nerves: No cranial nerve deficit.   Psychiatric:         Mood and Affect: Mood normal.         Thought Content: Thought content normal.         Judgment: Judgment normal.         (Additional Data:     Lab Results: I have personally reviewed pertinent reports.      Results from last 7 days   Lab Units 09/07/24  1826   WBC Thousand/uL 3.76*   HEMOGLOBIN g/dL 9.9*   HEMATOCRIT % 31.4*   PLATELETS Thousands/uL 83*   SEGS PCT % 59   LYMPHO PCT % 29   MONO PCT % 9   EOS PCT % 2     Results from last 7 days   Lab Units 09/07/24  1826   POTASSIUM mmol/L 4.4   CHLORIDE mmol/L 97   CO2 mmol/L 35*   BUN mg/dL 24   CREATININE mg/dL 1.07   CALCIUM mg/dL 10.5*   ALK PHOS U/L 41   ALT U/L 15   AST U/L 27           Imaging: I have personally reviewed pertinent reports.      No results found.    EKG, Pathology, and Other Studies Reviewed on Admission:   EKG:     Epic / Care Everywhere Records Reviewed: Yes     ** Please Note: This note has been constructed using a voice recognition system. **

## 2024-09-08 NOTE — UTILIZATION REVIEW
Initial Clinical Review      OBS order 9/7 2031 converted to IP On 9/8 1015 for treatment of acute on chronic HF     Admission: Date/Time/Statement:   Admission Orders (From admission, onward)       Ordered        09/08/24 1015  INPATIENT ADMISSION  Once            09/07/24 2031  Place in Observation  Once                           INPATIENT ADMISSION     Standing Status:   Standing     Number of Occurrences:   1     Order Specific Question:   Level of Care     Answer:   Med Surg [16]     Order Specific Question:   Estimated length of stay     Answer:   More than 2 Midnights     Order Specific Question:   Certification     Answer:   I certify that inpatient services are medically necessary for this patient for a duration of greater than two midnights. See H&P and MD Progress Notes for additional information about the patient's course of treatment.     ED Arrival Information       Expected   -    Arrival   9/7/2024 18:07    Acuity   Urgent              Means of arrival   Wheelchair    Escorted by   Family Member    Service   Hospitalist    Admission type   Emergency              Arrival complaint   Leg Swelling             Chief Complaint   Patient presents with    Leg Swelling     Pt having b/l leg swelling x 1 week        Initial Presentation: 89 y.o. female to ED from home w/ PMHX atrial fibrillation, status post watchman, diastolic congestive heart failure, hypertension, c/o of bilateral leg swelling with shortness of breath over the past several days. EKG w/o ischemic changes . Given iv diuretics in ED . Trop neg . Admitted OBS status w/ acute on chronic HF . Plan for iv diuresis , tele , monitor I&O ,weights , monitor K replete prn . Chronic resp failure on 2l , cont pulse ox . Afib tele , cont tenormin , asa. HTN cont amlodipine and Tenormin .     PE: BLE edema     Anticipated Length of Stay/Certification Statement: Patient will be admitted on an Observation basis with an anticipated length of stay of  < 2  midnights.   Justification for Hospital Stay: Dyspnea     9/8 IM Note   States breathing is improved . Leg edema has improved. Cont IV lasix , she is -1 l , but still in fluid overload . Recheck labs in am . DC tele . Rate controlled on tenormin The current medical regimen is effective;  continue present plan and medications. Cont Asa. + rales and swelling . O2 sat 92-97 % on 2l     Date: 9/9  Day 3: Has surpassed a 2nd midnight with active treatments and services. Cont stay for CHF . + rales . + swelling on R . -270 ml output last 24hrs . Chronically on 2l . Cardiac enzymes are neg . Cont IV lasix BID . Plan to transition to po tomorrow . Echo pending . Recheck labs in am .         ED Triage Vitals   Temperature Pulse Respirations Blood Pressure SpO2 Pain Score   09/07/24 1823 09/07/24 1823 09/07/24 1823 09/07/24 1823 09/07/24 1823 09/07/24 2057   98.5 °F (36.9 °C) 85 18 (!) 173/72 93 % No Pain     Weight (last 2 days)       Date/Time Weight    09/09/24 0515 89.1 (196.4)    09/08/24 0600 88.7 (195.6)    09/07/24 2059 92.2 (203.26)    09/07/24 1823 93.1 (205.25)            Vital Signs (last 3 days)       Date/Time Temp Pulse Resp BP MAP (mmHg) SpO2 Calculated FIO2 (%) - Nasal Cannula Nasal Cannula O2 Flow Rate (L/min) O2 Device Patient Position - Orthostatic VS Efren Coma Scale Score Pain    09/09/24 06:37:59 97.2 °F (36.2 °C) 58 -- 119/57 78 98 % -- -- -- -- -- --    09/08/24 2200 -- -- -- -- -- -- 28 2 L/min Nasal cannula -- 15 --    09/08/24 1900 97.8 °F (36.6 °C) -- 18 121/54 -- -- 28 2 L/min Nasal cannula Lying -- No Pain    09/08/24 17:04:41 -- 53 -- 119/52 74 99 % -- -- -- -- -- --    09/08/24 1704 -- 53 -- 119/52 -- -- -- -- -- Sitting -- --    09/08/24 15:05:39 98.6 °F (37 °C) 43 18 106/50 69 95 % -- -- -- -- -- --    09/08/24 12:13:48 97.5 °F (36.4 °C) 56 18 117/60 79 95 % 28 2 L/min Nasal cannula Sitting -- --    09/08/24 11:40:52 97.2 °F (36.2 °C) 52 18 105/49 68 97 % 28 2 L/min Nasal cannula Lying --  --    09/08/24 1027 -- -- -- -- -- -- -- -- -- -- -- No Pain    09/08/24 09:00:16 96.8 °F (36 °C) 62 18 123/62 82 97 % -- -- -- -- -- --    09/08/24 0900 -- -- -- -- -- -- 28 2 L/min Nasal cannula -- -- No Pain    09/08/24 08:58:49 -- 63 -- -- -- 97 % -- -- -- -- -- --    09/08/24 0300 98.4 °F (36.9 °C) 94 17 120/76 -- 96 % -- -- -- Lying -- --    09/07/24 2100 -- -- -- -- -- -- 28 2 L/min Nasal cannula -- 15 --    09/07/24 2059 98.1 °F (36.7 °C) 70 17 119/82 94 95 % -- -- -- Lying -- No Pain    09/07/24 20:58:55 98.1 °F (36.7 °C) 70 19 119/82 94 95 % -- -- -- -- -- --    09/07/24 2057 -- -- -- -- -- -- -- -- -- -- -- No Pain    09/07/24 1940 -- 70 22 150/60 87 95 % -- -- -- -- -- --    09/07/24 1920 -- 68 18 131/55 86 96 % -- -- -- -- -- --    09/07/24 1915 -- 67 20 127/61 88 95 % -- -- -- -- -- --    09/07/24 1900 -- 71 19 133/60 87 95 % -- -- -- -- -- --    09/07/24 1857 -- -- -- -- -- -- -- -- -- -- 15 --    09/07/24 1830 -- 69 -- 135/65 93 94 % 28 2 L/min Nasal cannula -- -- --    09/07/24 1823 98.5 °F (36.9 °C) 85 18 173/72 103 93 % 28 2 L/min Nasal cannula Sitting -- --              Pertinent Labs/Diagnostic Test Results:   Radiology:  XR chest 1 view portable   ED Interpretation by Ilya Lopez MD (09/07 1907)   Cardiomegaly, increased central vascular congestion consistent with CHF.      Final Interpretation by Olga Lidia Vitale MD (09/08 1157)      Enlarged cardiac silhouette. Pulmonary vascular congestion.            Workstation performed: [id number]           Cardiology:  No orders to display     GI:  No orders to display           Results from last 7 days   Lab Units 09/09/24 0513 09/08/24  0510 09/07/24  1826   WBC Thousand/uL 2.72* 2.55* 3.76*   HEMOGLOBIN g/dL 8.9* 8.6* 9.9*   HEMATOCRIT % 29.4* 27.6* 31.4*   PLATELETS Thousands/uL 69* 62* 83*   TOTAL NEUT ABS Thousands/µL 1.63*  --  2.23         Results from last 7 days   Lab Units 09/09/24 0513 09/08/24  0510 09/07/24  1826   SODIUM  mmol/L 139 141 139   POTASSIUM mmol/L 3.8 3.8 4.4   CHLORIDE mmol/L 98 99 97   CO2 mmol/L 38* 38* 35*   ANION GAP mmol/L 3* 4 7   BUN mg/dL 27* 21 24   CREATININE mg/dL 1.04 0.95 1.07   EGFR ml/min/1.73sq m 47 52 45   CALCIUM mg/dL 9.1 9.4 10.5*   MAGNESIUM mg/dL 1.9 1.9  --      Results from last 7 days   Lab Units 09/07/24  1826   AST U/L 27   ALT U/L 15   ALK PHOS U/L 41   TOTAL PROTEIN g/dL 7.6   ALBUMIN g/dL 4.4   TOTAL BILIRUBIN mg/dL 0.66         Results from last 7 days   Lab Units 09/09/24  0513 09/08/24  0510 09/07/24  1826   GLUCOSE RANDOM mg/dL 94 93 113         Results from last 7 days   Lab Units 09/07/24  1826   HS TNI 0HR ng/L 28     Results from last 7 days   Lab Units 09/07/24  1826   D-DIMER QUANTITATIVE ug/ml FEU 2.16*         Results from last 7 days   Lab Units 09/07/24  1826   BNP pg/mL 389*     Results from last 7 days   Lab Units 09/07/24  1826   CLARITY UA  Clear   COLOR UA  Straw   SPEC GRAV UA  1.010   PH UA  7.0   GLUCOSE UA mg/dl Negative   KETONES UA mg/dl Negative   BLOOD UA  Negative   PROTEIN UA mg/dl Negative   NITRITE UA  Negative   BILIRUBIN UA  Negative   UROBILINOGEN UA E.U./dl 0.2   LEUKOCYTES UA  Trace*   WBC UA /hpf 0-1   RBC UA /hpf None Seen   BACTERIA UA /hpf None Seen   EPITHELIAL CELLS WET PREP /hpf Occasional         ED Treatment-Medication Administration from 09/07/2024 1805 to 09/07/2024 2049         Date/Time Order Dose Route Action     09/07/2024 1921 furosemide (LASIX) injection 40 mg 40 mg Intravenous Given            Past Medical History:   Diagnosis Date    A-fib (HCC)     Coronary artery disease     Dizziness     Edema     GERD (gastroesophageal reflux disease)     High cholesterol     Hypertension      Present on Admission:   Acute on chronic diastolic (congestive) heart failure (HCC)   Chronic respiratory failure with hypoxia (HCC)   Morbid obesity (HCC)   Essential hypertension   Paroxysmal A-fib (HCC)   Other secondary gout, multiple sites      Admitting  Diagnosis: Leg swelling [M79.89]  Acute on chronic congestive heart failure, unspecified heart failure type (HCC) [I50.9]  Age/Sex: 89 y.o. female  Admission Orders:  Scheduled Medications:  allopurinol, 200 mg, Oral, Daily  amLODIPine, 5 mg, Oral, Daily  aspirin, 81 mg, Oral, Daily  atenolol, 25 mg, Oral, Daily  fluticasone, 1 spray, Nasal, Daily  furosemide, 40 mg, Intravenous, BID  lisinopril, 10 mg, Oral, Daily  pravastatin, 40 mg, Oral, HS      Continuous IV Infusions:     PRN Meds:  meclizine, 25 mg, Oral, Q8H PRN    Fld restriction   PT OT   Tele   I&O   Up as quentin       IP CONSULT TO NUTRITION SERVICES  IP CONSULT TO CARDIOLOGY    Network Utilization Review Department  ATTENTION: Please call with any questions or concerns to [phone] and carefully listen to the prompts so that you are directed to the right person. All voicemails are confidential.   For Discharge needs, contact Care Management DC Support Team at [phone] opt. [phone]  Send all requests for admission clinical reviews, approved or denied determinations and any other requests to dedicated fax number below belonging to the campus where the patient is receiving treatment. List of dedicated fax numbers for the Facilities:  FACILITY NAME UR FAX NUMBER   ADMISSION DENIALS (Administrative/Medical Necessity) [phone]   DISCHARGE SUPPORT TEAM (NETWORK) [phone]   PARENT CHILD HEALTH (Maternity/NICU/Pediatrics) [phone]   [facility] [phone]   [facility] [phone]   [facility] [phone]   [facility] [phone]   [facility] [phone]   [facility] [phone]   [facility] [phone]   [facility] [phone]   [facility] [phone]   [facility]  [phone]   [facility] [phone]   [facility] [phone]

## 2024-09-08 NOTE — PROGRESS NOTES
[facility]  Progress Note  Name: Violetta Olson I  MRN: [id number]  Unit/Bed#: -01 I Date of Admission: 9/7/2024   Date of Service: 9/8/2024 I Hospital Day: 0    Assessment & Plan   * Acute on chronic diastolic (congestive) heart failure (HCC)  Assessment & Plan  Wt Readings from Last 3 Encounters:   09/08/24 88.7 kg (195 lb 9.6 oz)   06/04/24 89.7 kg (197 lb 12 oz)   03/31/21 94.8 kg (208 lb 15 oz)     Patient presents today (9/7) with complaints of persistent/progressive shortness of breath as well as bilateral lower extremity edema over the past several days  Patient reports compliance with home medication regimen including Lasix twice daily-follows regularly with cardiology in the outpatient setting, [facility] cardiology  Patient without complaints of chest pressure or palpitations    In ED, patient maintaining adequate saturation on chronic 2 L nasal cannula, without SIRS criteria, hemodynamically stable  Weight on presentation today is up from previous  Cardiac enzymes negative  Most recent echocardiogram on file from 2020-preserved ejection fraction with evidence of diastolic dysfunction-repeat if possible inpatient-if unavailable, referral/prescription for outpatient echocardiogram  She is on Lasix 40 mg IV twice daily she is -1 L she is feeling better but still fluid overload will continue  Update 2D echo  Laboratories in the morning  Cardiology to see  No abnormality on telemetry discontinue          Pancytopenia (HCC)  Assessment & Plan  Suspect in light of liver cirrhosis    Liver cirrhosis (HCC)  Assessment & Plan  Evident on CAT scan abdomen and pelvis follows with gastroenterology    Chronic respiratory failure with hypoxia (HCC)  Assessment & Plan  Patient chronically maintained on 2 L/min   Presents with saturations in the mid 90s on chronic 2 L  Continuous pulse oximetry  Chronic condition/stable    Other secondary gout, multiple sites  Assessment & Plan  Chronic  condition/stable    Paroxysmal A-fib (HCC)  Assessment & Plan  Patient is status post Watchman  No longer maintained on systemic anticoagulation  Continue daily aspirin  Rate controlled-continue Tenormin 25 mg p.o. daily    Essential hypertension  Assessment & Plan  Blood pressure controlled  Continue amlodipine and Tenormin    Morbid obesity (HCC)  Assessment & Plan  Recommend incorporating a more whole foods plant-predominant diet along with decreasing consumption of red meats and processed foods  Per AHA guidelines, recommend moderate-vigorous intensity exercise for 30 minutes a day for 5 days a week or a total of 150 min/week                 VTE Pharmacologic Prophylaxis:   High Risk (Score >/= 5) - Pharmacological DVT Prophylaxis Contraindicated. Sequential Compression Devices Ordered.    Mobility:   JH-HLM Achieved: 6: Walk 10 steps or more  JH-HLM Goal achieved. Continue to encourage appropriate mobility.    Patient Centered Rounds: I performed bedside rounds with nursing staff today.   Discussions with Specialists or Other Care Team Provider: none    Education and Discussions with Family / Patient: pt will update family     Total Time Spent on Date of Encounter in care of patient: >35 mins. This time was spent on one or more of the following: performing physical exam; counseling and coordination of care; obtaining or reviewing history; documenting in the medical record; reviewing/ordering tests, medications or procedures; communicating with other healthcare professionals and discussing with patient's family/caregivers.    Current Length of Stay: 0 day(s)  Current Patient Status: Inpatient   Certification Statement: The patient will continue to require additional inpatient hospital stay due to chf  Discharge Plan: Anticipate discharge in 48 hrs to discharge location to be determined pending rehab evaluations.    Code Status: Level 1 - Full Code    Subjective:   Patient seen and examined states her breathing  is better no chest pain no productive cough leg edema has improved    Objective:     Vitals:   Temp (24hrs), Av °F (36.7 °C), Min:96.8 °F (36 °C), Max:98.5 °F (36.9 °C)    Temp:  [96.8 °F (36 °C)-98.5 °F (36.9 °C)] 96.8 °F (36 °C)  HR:  [62-94] 62  Resp:  [17-22] 18  BP: (119-173)/(55-82) 123/62  SpO2:  [93 %-97 %] 97 %  Body mass index is 31.57 kg/m².     Input and Output Summary (last 24 hours):     Intake/Output Summary (Last 24 hours) at 2024 1020  Last data filed at 2024 0846  Gross per 24 hour   Intake 180 ml   Output 1200 ml   Net -1020 ml       Physical Exam:   Physical Exam  Vitals and nursing note reviewed.   Constitutional:       General: She is not in acute distress.     Appearance: She is well-developed.   HENT:      Head: Normocephalic and atraumatic.   Eyes:      Conjunctiva/sclera: Conjunctivae normal.   Cardiovascular:      Rate and Rhythm: Normal rate and regular rhythm.      Heart sounds: No murmur heard.  Pulmonary:      Effort: Pulmonary effort is normal. No respiratory distress.      Breath sounds: Rales present.   Abdominal:      General: There is no distension.      Palpations: Abdomen is soft.      Tenderness: There is no abdominal tenderness.   Musculoskeletal:         General: Swelling present.      Cervical back: Neck supple.   Skin:     General: Skin is warm and dry.      Capillary Refill: Capillary refill takes less than 2 seconds.   Neurological:      Mental Status: She is alert and oriented to person, place, and time.   Psychiatric:         Mood and Affect: Mood normal.          Additional Data:     Labs:  Results from last 7 days   Lab Units 24  0510 24  1826   WBC Thousand/uL 2.55* 3.76*   HEMOGLOBIN g/dL 8.6* 9.9*   HEMATOCRIT % 27.6* 31.4*   PLATELETS Thousands/uL 62* 83*   SEGS PCT %  --  59   LYMPHO PCT %  --  29   MONO PCT %  --  9   EOS PCT %  --  2     Results from last 7 days   Lab Units 24  0510 24  1826   SODIUM mmol/L 141 139    POTASSIUM mmol/L 3.8 4.4   CHLORIDE mmol/L 99 97   CO2 mmol/L 38* 35*   BUN mg/dL 21 24   CREATININE mg/dL 0.95 1.07   ANION GAP mmol/L 4 7   CALCIUM mg/dL 9.4 10.5*   ALBUMIN g/dL  --  4.4   TOTAL BILIRUBIN mg/dL  --  0.66   ALK PHOS U/L  --  41   ALT U/L  --  15   AST U/L  --  27   GLUCOSE RANDOM mg/dL 93 113                       Lines/Drains:  Invasive Devices       Peripheral Intravenous Line  Duration             Peripheral IV 09/07/24 Left Antecubital <1 day                          Imaging: Reviewed radiology reports from this admission including: chest xray    Recent Cultures (last 7 days):         Last 24 Hours Medication List:   Current Facility-Administered Medications   Medication Dose Route Frequency Provider Last Rate    allopurinol  200 mg Oral Daily Luther Velazquez, DO      amLODIPine  5 mg Oral Daily Luther Velazquez, DO      aspirin  81 mg Oral Daily Luther Velazquez, DO      atenolol  25 mg Oral Daily Luther Velazquez, DO      fluticasone  1 spray Nasal Daily Luther Velazquez, DO      furosemide  40 mg Intravenous BID Luther Velazquez, DO      lisinopril  10 mg Oral Daily Luther Velazquez, DO      meclizine  25 mg Oral Q8H PRN Luther Velazquez, DO      pravastatin  40 mg Oral HS Luther Velazquez, DO          Today, Patient Was Seen By: Zenaida Pérez MD    **Please Note: This note may have been constructed using a voice recognition system.**

## 2024-09-08 NOTE — ASSESSMENT & PLAN NOTE
Wt Readings from Last 3 Encounters:   09/08/24 88.7 kg (195 lb 9.6 oz)   06/04/24 89.7 kg (197 lb 12 oz)   03/31/21 94.8 kg (208 lb 15 oz)     Patient presents today (9/7) with complaints of persistent/progressive shortness of breath as well as bilateral lower extremity edema over the past several days  Patient reports compliance with home medication regimen including Lasix twice daily-follows regularly with cardiology in the outpatient setting, [facility]  Patient without complaints of chest pressure or palpitations    In ED, patient maintaining adequate saturation on chronic 2 L nasal cannula, without SIRS criteria, hemodynamically stable  Weight on presentation today is up from previous  Cardiac enzymes negative  Most recent echocardiogram on file from 2020-preserved ejection fraction with evidence of diastolic dysfunction-repeat if possible inpatient-if unavailable, referral/prescription for outpatient echocardiogram  She is on Lasix 40 mg IV twice daily she is -1 L she is feeling better but still fluid overload will continue  Update 2D echo  Laboratories in the morning  Cardiology to see  No abnormality on telemetry discontinue

## 2024-09-08 NOTE — OCCUPATIONAL THERAPY NOTE
"    Occupational Therapy Evaluation     Patient Name: Violetta Olson  Today's Date: 9/8/2024  Problem List  Principal Problem:    Acute on chronic diastolic (congestive) heart failure (HCC)  Active Problems:    Morbid obesity (HCC)    Essential hypertension    Paroxysmal A-fib (HCC)    Other secondary gout, multiple sites    Chronic respiratory failure with hypoxia (HCC)    Liver cirrhosis (HCC)    Pancytopenia (HCC)    Past Medical History  Past Medical History:   Diagnosis Date    A-fib (HCC)     Coronary artery disease     Dizziness     Edema     GERD (gastroesophageal reflux disease)     High cholesterol     Hypertension      Past Surgical History  Past Surgical History:   Procedure Laterality Date    CHOLECYSTECTOMY OPEN           09/08/24 1027   OT Last Visit   OT Visit Date 09/08/24   Note Type   Note type Evaluation   Pain Assessment   Pain Assessment Tool 0-10   Pain Score No Pain   Restrictions/Precautions   Other Precautions Chair Alarm;Bed Alarm;O2;Fluid restriction;Fall Risk   Home Living   Type of Home House   Home Layout Two level;Performs ADLs on one level  (pt remains on first floor)   Bathroom Shower/Tub Walk-in shower   Bathroom Toilet Raised   Bathroom Equipment Shower chair  (pt notes the shower chair is too big and she does not use it; daughter comes down in the morning while pt showers to provide supervision and assist as needed)   Home Equipment Walker  (pt has rollator and RW; she primarily uses rollator, she uses RW for overnight equipped with a flash light and \"crow caller\" that she can blow if she needs her son)   Additional Comments lives with her son in a two story home but pt remains on first floor, 1 small step to enter the home. [place] and her son goes out to work on the farm during the day but is around if needed. Pt also reports her daughter lives 5 minutes away and is retired and comes to check in on her and help as needed.   Prior Function   Level of [place] " "Independent with functional mobility;Needs assistance with IADLS;Needs assistance with ADLs  (c rollator vs RW, has A for socks and bra but otherwise completes ADL tasks for self)   Lives With Son   Receives Help From Family   IADLs Independent with meal prep;Family/Friend/Other provides transportation;Independent with medication management  (son also assists with cooking; pt completes her own laundry, daughter or son provides transportation)   Falls in the last 6 months 0   Vocational Retired   Subjective   Subjective \"I make all kinds of food still, I even make my own ketchup.\"   ADL   Where Assessed Edge of bed   LB Dressing Assistance   (total A to don/doff socks which is baseline for pt as daughter or son completes this for her at home; supervision otherwise for clothing management up over hips in stance)   Toileting Assistance  5  Supervision/Setup   Toileting Deficit Setup;Supervison/safety;Increased time to complete  (continent of urine)   Bed Mobility   Rolling R 5  Supervision   Additional items Increased time required   Supine to Sit 6  Modified independent   Additional items Increased time required   Sit to Supine 6  Modified independent   Additional items Increased time required   Additional Comments flat bed to simulate home environment   Transfers   Sit to Stand   (SBA)   Additional items Increased time required;Verbal cues   Stand to Sit   (SBA)   Additional items Increased time required;Verbal cues   Stand pivot   (SBA)   Additional items Increased time required;Verbal cues   Toilet transfer   (SBA)   Additional items Increased time required;Verbal cues   Functional Mobility   Functional Mobility   (SBA)   Additional Comments ambulated in room/bathroom during today's session   Additional items Rolling walker   Balance   Static Sitting Good   Dynamic Sitting Fair +   Static Standing Fair +   Dynamic Standing Fair   Ambulatory Fair   Activity Tolerance   Activity Tolerance Patient tolerated treatment " well   Medical Staff Made Aware Estefani KAUR   RUE Assessment   RUE Assessment   (limitations at shoulder d/t prior injury, otherwise WFL)   LUE Assessment   LUE Assessment   (limitations at shoulder d/t prior injury, otherwise WFL)   Hand Function   Gross Motor Coordination Functional   Fine Motor Coordination Functional   Sensation   Light Touch No apparent deficits   Vision-Basic Assessment   Current Vision Wears glasses only for reading   Patient Visual Report   (pt notes no new visual deficits at this time)   Cognition   Overall Cognitive Status WFL   Orientation Level Oriented X4   Assessment   Limitation Decreased UE strength;Decreased endurance   Prognosis Good   Assessment Pt is a 89 y.o. female, admitted to [facility] 9/7/2024 d/t experiencing B LE swelling. Dx: acute on chronic congestive heart failure. Pt with PMHx impacting their performance during ADL tasks, including: atrial fibrillation, status post watchman, diastolic congestive heart failure, hypertension . Prior to admission to the hospital Pt was performing ADLs with physical assistance. IADLs with physical assistance. Functional transfers/ambulation without physical assistance. Cognitive status was PTA was WFL. OT order placed to assess Pt's ADLs, cognitive status, and performance during functional tasks in order to maximize safety and independence while making most appropriate d/c recommendations. Pt's clinical presentation is currently evolving given new onset deficits that effect Pt's occupational performance and ability to safely return to PLOF including decrease activity tolerance, decrease standing balance, decrease performance during ADL tasks, decrease generalized strength, and decrease activity engagement combined with medical complications of hypertension , A-fib, and low SpO2 values. Personal factors affecting Pt at time of initial evaluation include: step(s) to enter environment, multi-level environment, advanced age, and inability to  navigate community distances. Pt will benefit from continued skilled OT services to address deficits as defined above and to maximize level independence/participation during ADLs and functional tasks to facilitate return toward PLOF and improved quality of life. From an occupational therapy standpoint, recommendation at time of d/c would be No Post-Acute Rehabilitation Needs.   Plan   Treatment Interventions ADL retraining;Functional transfer training;Endurance training;UE strengthening/ROM;Energy conservation;Activityengagement   Goal Expiration Date 09/22/24   OT Treatment Day 1   OT Frequency 1-2x/wk   Discharge Recommendation   Rehab Resource Intensity Level, OT No post-acute rehabilitation needs   AM-PAC Daily Activity Inpatient   Lower Body Dressing 3   Bathing 4   Toileting 4   Upper Body Dressing 3   Grooming 4   Eating 4   Daily Activity Raw Score 22   Daily Activity Standardized Score (Calc for Raw Score >=11) 47.1   AM-PAC Applied Cognition Inpatient   Following a Speech/Presentation 4   Understanding Ordinary Conversation 4   Taking Medications 4   Remembering Where Things Are Placed or Put Away 4   Remembering List of 4-5 Errands 4   Taking Care of Complicated Tasks 4   Applied Cognition Raw Score 24   Applied Cognition Standardized Score 62.21   Additional Treatment Session   Start Time 1041   End Time 1051   Treatment Assessment Pt tolerated today's treatment well and was pleasant and cooperative throughout. Pt completed toileting during today's session. Pt was continent of urine and able to complete all aspects of toileting with supervision level. Pt completed hand washing in stance at sink c RW and supervision for safety. Pt completed transfers and mobility c SBA for safety with use of RW in room/bathroom. At end of session, pt safely supine in bed with all needs in reach, bed alarm activated.   Additional Treatment Day 1     The patient's raw score on the AM-PAC Daily Activity inpatient short form  is 22, standardized score is 47.1, greater than 39.4. Patients at this level are likely to benefit from DC to home. Please refer to the recommendation of the Occupational Therapist for safe DC planning.    Pt goals to be met by 9/22:    Pt will demonstrate ability to complete grooming/hygiene tasks @ mod I after set-up.  Pt will demonstrate ability to complete supine<>sit @ mod I in order to increase safety and independence during ADL tasks.  Pt will demonstrate ability to complete UB ADLs including washing/dressing @ mod I in order to increase performance and participation during meaningful tasks  Pt will demonstrate ability to complete LB dressing @ mod I in order to increase safety and independence during meaningful tasks.   Pt will demonstrate ability to complete toileting tasks including CM and pericare @ mod I in order to increase safety and independence during meaningful tasks.  Pt will demonstrate ability to complete EOB, chair, toilet/commode transfers @ mod I in order to increase performance and participation during functional tasks.  Pt will demonstrate ability to stand for 10 minutes while maintaining good balance with use of LRAD for UB support PRN.  Pt will demonstrate ability to tolerate 30-35 minute OT session with no vc'ing for deep breathing or use of energy conservation techniques in order to increase activity tolerance during functional tasks.   Pt will demonstrate Good carryover of use of energy conservation/compensatory strategies during ADLs and functional tasks in order to increase safety and reduce risk for falls.   Pt will demonstrate Good attention and participation in continued evaluation of functional ambulation house hold distances in order to assist with safe d/c planning.  Pt will identify 3 areas of interest/hobbies and 1 intervention on how to incorporate into daily life in order to increase interaction with environment and peers as well as increase participation in meaningful  tasks.   Pt will demonstrate 100% carryover of BUE HEP in order to increase BUE MS and increase performance during functional tasks upon d/c home.    Alix Gunderson MS OTR/L

## 2024-09-08 NOTE — ASSESSMENT & PLAN NOTE
Patient is status post Watchman  No longer maintained on systemic anticoagulation  Continue daily aspirin  Rate controlled-continue Tenormin 25 mg p.o. daily  Monitor on telemetry in the setting of volume unloading

## 2024-09-08 NOTE — PLAN OF CARE
Problem: OCCUPATIONAL THERAPY ADULT  Goal: Performs self-care activities at highest level of function for planned discharge setting.  See evaluation for individualized goals.  Description: Treatment Interventions: ADL retraining, Functional transfer training, Endurance training, UE strengthening/ROM, Energy conservation, Activityengagement          See flowsheet documentation for full assessment, interventions and recommendations.   Note: Limitation: Decreased UE strength, Decreased endurance  Prognosis: Good  Assessment: Pt is a 89 y.o. female, admitted to [facility] 9/7/2024 d/t experiencing B LE swelling. Dx: acute on chronic congestive heart failure. Pt with PMHx impacting their performance during ADL tasks, including: atrial fibrillation, status post watchman, diastolic congestive heart failure, hypertension . Prior to admission to the hospital Pt was performing ADLs with physical assistance. IADLs with physical assistance. Functional transfers/ambulation without physical assistance. Cognitive status was PTA was WFL. OT order placed to assess Pt's ADLs, cognitive status, and performance during functional tasks in order to maximize safety and independence while making most appropriate d/c recommendations. Pt's clinical presentation is currently evolving given new onset deficits that effect Pt's occupational performance and ability to safely return to OF including decrease activity tolerance, decrease standing balance, decrease performance during ADL tasks, decrease generalized strength, and decrease activity engagement combined with medical complications of hypertension , A-fib, and low SpO2 values. Personal factors affecting Pt at time of initial evaluation include: step(s) to enter environment, multi-level environment, advanced age, and inability to navigate community distances. Pt will benefit from continued skilled OT services to address deficits as defined above and to maximize level independence/participation  during ADLs and functional tasks to facilitate return toward PLOF and improved quality of life. From an occupational therapy standpoint, recommendation at time of d/c would be No Post-Acute Rehabilitation Needs.     Rehab Resource Intensity Level, OT: No post-acute rehabilitation needs

## 2024-09-08 NOTE — ASSESSMENT & PLAN NOTE
Patient chronically maintained on 2 L/min   Presents with saturations in the mid 90s on chronic 2 L  Continuous pulse oximetry  Chronic condition/stable

## 2024-09-08 NOTE — ASSESSMENT & PLAN NOTE
Patient is status post Watchman  No longer maintained on systemic anticoagulation  Continue daily aspirin  Rate controlled-continue Tenormin 25 mg p.o. daily

## 2024-09-08 NOTE — ASSESSMENT & PLAN NOTE
Recommend incorporating a more whole foods plant-predominant diet along with decreasing consumption of red meats and processed foods  Per AHA guidelines, recommend moderate-vigorous intensity exercise for 30 minutes a day for 5 days a week or a total of 150 min/week

## 2024-09-08 NOTE — PLAN OF CARE
Problem: CARDIOVASCULAR - ADULT  Goal: Maintains optimal cardiac output and hemodynamic stability  Description: INTERVENTIONS:  - Monitor I/O, vital signs and rhythm  - Monitor for S/S and trends of decreased cardiac output SOB, leg swelling,  - Administer and titrate ordered vasoactive medications to optimize hemodynamic stability  - Assess quality of pulses, skin color and temperature  - Assess for signs of decreased coronary artery perfusion  - Instruct patient to report change in severity of symptoms  Outcome: Progressing

## 2024-09-08 NOTE — ASSESSMENT & PLAN NOTE
Mildly elevated systolic blood pressure upon presentation consistent with presentation with volume overload  Improved over ED course after IV Lasix administration  Chronic condition/stable  Continue amlodipine and Tenormin

## 2024-09-08 NOTE — ASSESSMENT & PLAN NOTE
Wt Readings from Last 3 Encounters:   09/07/24 92.2 kg (203 lb 4.2 oz)   06/04/24 89.7 kg (197 lb 12 oz)   03/31/21 94.8 kg (208 lb 15 oz)     Patient presents today (9/7) with complaints of persistent/progressive shortness of breath as well as bilateral lower extremity edema over the past several days  Patient reports compliance with home medication regimen including Lasix twice daily-follows regularly with cardiology in the outpatient setting, [facility]  Patient without complaints of chest pressure or palpitations    In ED, patient maintaining adequate saturation on chronic 2 L nasal cannula, without SIRS criteria, hemodynamically stable  Weight on presentation today is up from previous  Cardiac enzymes negative  Given weight gain and symptoms index suspicion for slight exacerbation of underlying diastolic heart failure prompting request for observation    Plan:  Observe under hospitalist service  Received 1 dose of intravenous loop diuretic therapy in the ED-monitor output over the next 12 hours  Proceed with IV diuretic therapy for dose tomorrow morning and assess for symptomatic improvement  It certainly possible the patient may attain euvolemic state within 24 to 48 hours  Daily potassium repletion  Telemetry over first 24 hours  Strict intake and output  Daily weights  Most recent echocardiogram on file from 2020-preserved ejection fraction with evidence of diastolic dysfunction-repeat if possible inpatient-if unavailable, referral/prescription for outpatient echocardiogram

## 2024-09-09 ENCOUNTER — APPOINTMENT (INPATIENT)
Dept: NON INVASIVE DIAGNOSTICS | Facility: HOSPITAL | Age: 89
DRG: 291 | End: 2024-09-09
Payer: COMMERCIAL

## 2024-09-09 ENCOUNTER — APPOINTMENT (INPATIENT)
Dept: RADIOLOGY | Facility: HOSPITAL | Age: 89
DRG: 291 | End: 2024-09-09
Payer: COMMERCIAL

## 2024-09-09 PROBLEM — I48.11 LONGSTANDING PERSISTENT ATRIAL FIBRILLATION (HCC): Status: ACTIVE | Noted: 2021-03-27

## 2024-09-09 LAB
ANION GAP SERPL CALCULATED.3IONS-SCNC: 3 MMOL/L (ref 4–13)
AORTIC ROOT: 3.4 CM
AORTIC VALVE MEAN VELOCITY: 14.6 M/S
APICAL FOUR CHAMBER EJECTION FRACTION: 72 %
ASCENDING AORTA: 3.4 CM
AV AREA BY CONTINUOUS VTI: 0.8 CM2
AV AREA PEAK VELOCITY: 0.9 CM2
AV LVOT MEAN GRADIENT: 1 MMHG
AV LVOT PEAK GRADIENT: 2 MMHG
AV MEAN GRADIENT: 10 MMHG
AV PEAK GRADIENT: 18 MMHG
AV VALVE AREA: 0.82 CM2
AV VELOCITY RATIO: 0.35
BASOPHILS # BLD AUTO: 0.02 THOUSANDS/ÂΜL (ref 0–0.1)
BASOPHILS NFR BLD AUTO: 1 % (ref 0–1)
BSA FOR ECHO PROCEDURE: 1.98 M2
BUN SERPL-MCNC: 27 MG/DL (ref 5–25)
CALCIUM SERPL-MCNC: 9.1 MG/DL (ref 8.4–10.2)
CHLORIDE SERPL-SCNC: 98 MMOL/L (ref 96–108)
CO2 SERPL-SCNC: 38 MMOL/L (ref 21–32)
CREAT SERPL-MCNC: 1.04 MG/DL (ref 0.6–1.3)
DOP CALC AO PEAK VEL: 2.13 M/S
DOP CALC AO VTI: 49.51 CM
DOP CALC LVOT AREA: 2.54 CM2
DOP CALC LVOT CARDIAC INDEX: 0.98 L/MIN/M2
DOP CALC LVOT CARDIAC OUTPUT: 1.94 L/MIN
DOP CALC LVOT DIAMETER: 1.8 CM
DOP CALC LVOT PEAK VEL VTI: 15.95 CM
DOP CALC LVOT PEAK VEL: 0.74 M/S
DOP CALC LVOT STROKE INDEX: 19.2 ML/M2
DOP CALC LVOT STROKE VOLUME: 40.57
DOP CALC MV VTI: 54.41 CM
EOSINOPHIL # BLD AUTO: 0.12 THOUSAND/ÂΜL (ref 0–0.61)
EOSINOPHIL NFR BLD AUTO: 4 % (ref 0–6)
ERYTHROCYTE [DISTWIDTH] IN BLOOD BY AUTOMATED COUNT: 15.3 % (ref 11.6–15.1)
FRACTIONAL SHORTENING: 35 (ref 28–44)
GFR SERPL CREATININE-BSD FRML MDRD: 47 ML/MIN/1.73SQ M
GLUCOSE SERPL-MCNC: 94 MG/DL (ref 65–140)
HCT VFR BLD AUTO: 29.4 % (ref 34.8–46.1)
HGB BLD-MCNC: 8.9 G/DL (ref 11.5–15.4)
IMM GRANULOCYTES # BLD AUTO: 0.01 THOUSAND/UL (ref 0–0.2)
IMM GRANULOCYTES NFR BLD AUTO: 0 % (ref 0–2)
INTERVENTRICULAR SEPTUM IN DIASTOLE (PARASTERNAL SHORT AXIS VIEW): 1.3 CM
INTERVENTRICULAR SEPTUM: 1.3 CM (ref 0.6–1.1)
LAAS-AP2: 28.9 CM2
LAAS-AP4: 28.1 CM2
LEFT ATRIUM SIZE: 5.4 CM
LEFT ATRIUM VOLUME (MOD BIPLANE): 92 ML
LEFT ATRIUM VOLUME INDEX (MOD BIPLANE): 46.5 ML/M2
LEFT INTERNAL DIMENSION IN SYSTOLE: 3.2 CM (ref 2.1–4)
LEFT VENTRICLE DIASTOLIC VOLUME (MOD BIPLANE): 56 ML
LEFT VENTRICLE DIASTOLIC VOLUME INDEX (MOD BIPLANE): 28.3 ML/M2
LEFT VENTRICLE SYSTOLIC VOLUME (MOD BIPLANE): 15 ML
LEFT VENTRICLE SYSTOLIC VOLUME INDEX (MOD BIPLANE): 7.6 ML/M2
LEFT VENTRICULAR INTERNAL DIMENSION IN DIASTOLE: 4.9 CM (ref 3.5–6)
LEFT VENTRICULAR POSTERIOR WALL IN END DIASTOLE: 1.2 CM
LEFT VENTRICULAR STROKE VOLUME: 72 ML
LV EF: 73 %
LVSV (TEICH): 72 ML
LYMPHOCYTES # BLD AUTO: 0.65 THOUSANDS/ÂΜL (ref 0.6–4.47)
LYMPHOCYTES NFR BLD AUTO: 24 % (ref 14–44)
MAGNESIUM SERPL-MCNC: 1.9 MG/DL (ref 1.9–2.7)
MCH RBC QN AUTO: 30.9 PG (ref 26.8–34.3)
MCHC RBC AUTO-ENTMCNC: 30.3 G/DL (ref 31.4–37.4)
MCV RBC AUTO: 102 FL (ref 82–98)
MONOCYTES # BLD AUTO: 0.29 THOUSAND/ÂΜL (ref 0.17–1.22)
MONOCYTES NFR BLD AUTO: 11 % (ref 4–12)
MV MEAN GRADIENT: 5 MMHG
MV PEAK GRADIENT: 14 MMHG
MV STENOSIS PRESSURE HALF TIME: 121 MS
MV VALVE AREA BY CONTINUITY EQUATION: 0.75 CM2
MV VALVE AREA P 1/2 METHOD: 1.82
NEUTROPHILS # BLD AUTO: 1.63 THOUSANDS/ÂΜL (ref 1.85–7.62)
NEUTS SEG NFR BLD AUTO: 60 % (ref 43–75)
NRBC BLD AUTO-RTO: 0 /100 WBCS
PLATELET # BLD AUTO: 69 THOUSANDS/UL (ref 149–390)
PMV BLD AUTO: 11.3 FL (ref 8.9–12.7)
POTASSIUM SERPL-SCNC: 3.8 MMOL/L (ref 3.5–5.3)
PROCALCITONIN SERPL-MCNC: <0.05 NG/ML
QRS AXIS: 120 DEGREES
QRSD INTERVAL: 84 MS
QT INTERVAL: 424 MS
QTC INTERVAL: 460 MS
RBC # BLD AUTO: 2.88 MILLION/UL (ref 3.81–5.12)
RIGHT ATRIUM AREA SYSTOLE A4C: 16.4 CM2
RIGHT VENTRICLE ID DIMENSION: 4.2 CM
SL CV LEFT ATRIUM LENGTH A2C: 7.4 CM
SL CV PED ECHO LEFT VENTRICLE DIASTOLIC VOLUME (MOD BIPLANE) 2D: 114 ML
SL CV PED ECHO LEFT VENTRICLE SYSTOLIC VOLUME (MOD BIPLANE) 2D: 42 ML
SODIUM SERPL-SCNC: 139 MMOL/L (ref 135–147)
T WAVE AXIS: -54 DEGREES
TR MAX PG: 71 MMHG
TR PEAK VELOCITY: 4.2 M/S
TRICUSPID ANNULAR PLANE SYSTOLIC EXCURSION: 1.5 CM
TRICUSPID VALVE PEAK REGURGITATION VELOCITY: 4.22 M/S
VENTRICULAR RATE: 71 BPM
WBC # BLD AUTO: 2.72 THOUSAND/UL (ref 4.31–10.16)

## 2024-09-09 PROCEDURE — 93306 TTE W/DOPPLER COMPLETE: CPT

## 2024-09-09 PROCEDURE — 99222 1ST HOSP IP/OBS MODERATE 55: CPT | Performed by: INTERNAL MEDICINE

## 2024-09-09 PROCEDURE — 83735 ASSAY OF MAGNESIUM: CPT | Performed by: FAMILY MEDICINE

## 2024-09-09 PROCEDURE — 80048 BASIC METABOLIC PNL TOTAL CA: CPT | Performed by: FAMILY MEDICINE

## 2024-09-09 PROCEDURE — 73502 X-RAY EXAM HIP UNI 2-3 VIEWS: CPT

## 2024-09-09 PROCEDURE — 93306 TTE W/DOPPLER COMPLETE: CPT | Performed by: INTERNAL MEDICINE

## 2024-09-09 PROCEDURE — 99232 SBSQ HOSP IP/OBS MODERATE 35: CPT | Performed by: FAMILY MEDICINE

## 2024-09-09 PROCEDURE — 93970 EXTREMITY STUDY: CPT

## 2024-09-09 PROCEDURE — 84145 PROCALCITONIN (PCT): CPT | Performed by: FAMILY MEDICINE

## 2024-09-09 PROCEDURE — 93010 ELECTROCARDIOGRAM REPORT: CPT | Performed by: INTERNAL MEDICINE

## 2024-09-09 PROCEDURE — 93970 EXTREMITY STUDY: CPT | Performed by: SURGERY

## 2024-09-09 PROCEDURE — 85025 COMPLETE CBC W/AUTO DIFF WBC: CPT | Performed by: FAMILY MEDICINE

## 2024-09-09 PROCEDURE — 97110 THERAPEUTIC EXERCISES: CPT

## 2024-09-09 PROCEDURE — 97163 PT EVAL HIGH COMPLEX 45 MIN: CPT

## 2024-09-09 RX ORDER — ATENOLOL 25 MG/1
12.5 TABLET ORAL DAILY
Status: DISCONTINUED | OUTPATIENT
Start: 2024-09-09 | End: 2024-09-09

## 2024-09-09 RX ORDER — FUROSEMIDE 10 MG/ML
40 INJECTION INTRAMUSCULAR; INTRAVENOUS 2 TIMES DAILY
Status: COMPLETED | OUTPATIENT
Start: 2024-09-09 | End: 2024-09-09

## 2024-09-09 RX ADMIN — ALLOPURINOL 200 MG: 100 TABLET ORAL at 08:59

## 2024-09-09 RX ADMIN — FUROSEMIDE 40 MG: 10 INJECTION, SOLUTION INTRAMUSCULAR; INTRAVENOUS at 17:37

## 2024-09-09 RX ADMIN — ASPIRIN 81 MG 81 MG: 81 TABLET ORAL at 08:59

## 2024-09-09 RX ADMIN — FUROSEMIDE 40 MG: 10 INJECTION, SOLUTION INTRAMUSCULAR; INTRAVENOUS at 10:18

## 2024-09-09 RX ADMIN — PRAVASTATIN SODIUM 40 MG: 40 TABLET ORAL at 21:27

## 2024-09-09 NOTE — ASSESSMENT & PLAN NOTE
Wt Readings from Last 3 Encounters:   09/09/24 89.1 kg (196 lb 6.4 oz)   06/04/24 89.7 kg (197 lb 12 oz)   03/31/21 94.8 kg (208 lb 15 oz)     Patient presents today (9/7) with complaints of persistent/progressive shortness of breath as well as bilateral lower extremity edema over the past several days  Patient reports compliance with home medication regimen including Lasix twice daily-follows regularly with cardiology in the outpatient setting, [facility]  Patient without complaints of chest pressure or palpitations    In ED, patient maintaining adequate saturation on chronic 2 L nasal cannula, without SIRS criteria, hemodynamically stable  Weight on presentation today is up from previous  Cardiac enzymes negative  Most recent echocardiogram on file from 2020-preserved ejection fraction with evidence of diastolic dysfunction-repeat if possible inpatient-if unavailable, referral/prescription for outpatient echocardiogram  Received well but still evidence of Rales and some edema on the legs as discussed with cardiology will continue Lasix 40 mg IV twice daily for another day hopefully to transition to p.o. tomorrow  Update 2D echo  Laboratories in the morning

## 2024-09-09 NOTE — PLAN OF CARE
Problem: PHYSICAL THERAPY ADULT  Goal: Performs mobility at highest level of function for planned discharge setting.  See evaluation for individualized goals.  Description: Treatment/Interventions: ADL retraining, Functional transfer training, LE strengthening/ROM, Elevations, Therapeutic exercise, Endurance training, Patient/family training, Equipment eval/education, Bed mobility, Gait training, Compensatory technique education, Spoke to nursing, Spoke to case management, OT  Equipment Recommended:  (walker-pt has)       See flowsheet documentation for full assessment, interventions and recommendations.  9/9/2024 1650 by Karla Alvarez PT  Note: Prognosis: Good  Problem List: Decreased strength, Decreased endurance, Impaired balance, Decreased mobility, Decreased safety awareness, Impaired hearing, Obesity  Assessment: Pt is a 89 y.o. female seen for PT evaluation s/p admission to [facility] on 9/7/2024 with Acute on chronic diastolic (congestive) heart failure (HCC).  Order placed for PT services.  Upon evaluation: Pt is presenting with impaired functional mobility due to decreased strength, decreased endurance, impaired balance, gait deviations, decreased safety awareness, impaired hearing, and fall risk requiring  supervision to stand by assistance for transfers and stand by to close supervision assistance for ambulation with RW . Pt's clinical presentation is currently unpredictable given the functional mobility deficits above, especially weakness, decreased endurance, impaired balance, gait deviations, decreased activity tolerance, decreased functional mobility tolerance, decreased safety awareness, and SOB upon exertion, coupled with fall risks as indicated by AM-PAC 6-Clicks: 18/24 as well as impaired balance, polypharmacy, decreased safety awareness, and obesity and combined with medical complications of bradycardia, abnormal H&H, abnormal WBCs, abnormal CO2 values, need for input for  mobility technique/safety, and acute CHF, pancytopenia .  Pt's PMHx and comorbidities that may affect physical performance and progress include: CHF and liver cirrhosis, chronic respiratory failure with hypoxia on 2 lpm NC, gout, Afib s/p Watchman, HTN, obesity, CAD . Personal factors affecting pt at time of IE include: step(s) to enter environment, advanced age, inability to perform IADLs, inability to perform ADLs, and inability to navigate level surfaces without external assistance. Pt will benefit from continued skilled PT services to address deficits as defined above and to maximize level of functional mobility to facilitate return toward PLOF and improved QOL. From PT/mobility standpoint, recommendation at time of d/c would be Level III (Minimum Resource Intensity), with family and/or caregiver support, and with walker in order to reduce fall risk and maximize pt's functional independence and consistency with mobility. Recommend trial with walker next 1-2 sessions and ther ex next 1-2 sessions.     Barriers to Discharge Comments: pt has uspport and assistance from family. increased fall risk  Rehab Resource Intensity Level, PT: III (Minimum Resource Intensity) (handout provided for OPPT services. pt aware of freedom of choice. patient declining OPPt services reporting she does her exercises at home.)    See flowsheet documentation for full assessment.

## 2024-09-09 NOTE — ASSESSMENT & PLAN NOTE
Patient is status post Watchman  No longer maintained on systemic anticoagulation  Continue daily aspirin  Patient is actually bradycardic and apparently not on any atenolol so we will discontinue

## 2024-09-09 NOTE — ASSESSMENT & PLAN NOTE
History of longstanding persistent a fib.  Previously on Xarelto, but secondary to GI bleeding/anemia she underwent Watchman with #31 mm [employer] Flex Watchman device 2/9/2023 at [facility] in [facility]  History of tachy-kalyn syndrome. Not maintained on AV hitesh blocking agents.  Currently remains in rate controlled a fib this admission.

## 2024-09-09 NOTE — ASSESSMENT & PLAN NOTE
Wt Readings from Last 3 Encounters:   09/10/24 88.2 kg (194 lb 6.4 oz)   06/04/24 89.7 kg (197 lb 12 oz)   03/31/21 94.8 kg (208 lb 15 oz)     History of HFpEF with valvular heart disease, pulmonary hypertension/portal hypertension with cirrhosis.  Exam is consistent with more of a right sided heart failure.  Echo 9/9/2024 shows LVEF 65% with mild AS, mild-mod MR, mild MS, mod TR with PASP 75-80mmHg. No significant change when compared to echo report from 2022.  Currently diuresing well with IV furosemide 40 mg BID.   Maintained on furosemide 60 mg BID outpatient.  Recommend transition to torsemide 60 mg daily today.  She is scheduled with her outpatient cardiologist Dr. Rolon tomorrow, Wednesday 9/11/2024.

## 2024-09-09 NOTE — PROGRESS NOTES
[facility]  Progress Note  Name: Violetta Olson I  MRN: [id number]  Unit/Bed#: -01 I Date of Admission: 9/7/2024   Date of Service: 9/9/2024 I Hospital Day: 1    Assessment & Plan   * Acute on chronic diastolic (congestive) heart failure (HCC)  Assessment & Plan  Wt Readings from Last 3 Encounters:   09/09/24 89.1 kg (196 lb 6.4 oz)   06/04/24 89.7 kg (197 lb 12 oz)   03/31/21 94.8 kg (208 lb 15 oz)     Patient presents today (9/7) with complaints of persistent/progressive shortness of breath as well as bilateral lower extremity edema over the past several days  Patient reports compliance with home medication regimen including Lasix twice daily-follows regularly with cardiology in the outpatient setting, [facility]  Patient without complaints of chest pressure or palpitations    In ED, patient maintaining adequate saturation on chronic 2 L nasal cannula, without SIRS criteria, hemodynamically stable  Weight on presentation today is up from previous  Cardiac enzymes negative  Most recent echocardiogram on file from 2020-preserved ejection fraction with evidence of diastolic dysfunction-repeat if possible inpatient-if unavailable, referral/prescription for outpatient echocardiogram  Received well but still evidence of Rales and some edema on the legs as discussed with cardiology will continue Lasix 40 mg IV twice daily for another day hopefully to transition to p.o. tomorrow  Update 2D echo  Laboratories in the morning          Pancytopenia (HCC)  Assessment & Plan  Suspect in light of liver cirrhosis    Liver cirrhosis (HCC)  Assessment & Plan  Evident on CAT scan abdomen and pelvis follows with gastroenterology    Chronic respiratory failure with hypoxia (HCC)  Assessment & Plan  Patient chronically maintained on 2 L/min   Presents with saturations in the mid 90s on chronic 2 L  Continuous pulse oximetry  Chronic condition/stable    Other secondary gout, multiple  sites  Assessment & Plan  Chronic condition/stable    Paroxysmal A-fib (HCC)  Assessment & Plan  Patient is status post Watchman  No longer maintained on systemic anticoagulation  Continue daily aspirin  Patient is actually bradycardic and apparently not on any atenolol so we will discontinue    Essential hypertension  Assessment & Plan  Soft blood pressures and bradycardic patient apparently is not on Norvasc at home and not on atenolol we will discontinue both lisinopril will be on hold while on diuresis    Morbid obesity (HCC)  Assessment & Plan  Recommend incorporating a more whole foods plant-predominant diet along with decreasing consumption of red meats and processed foods  Per AHA guidelines, recommend moderate-vigorous intensity exercise for 30 minutes a day for 5 days a week or a total of 150 min/week                 VTE Pharmacologic Prophylaxis:   Moderate Risk (Score 3-4) - Pharmacological DVT Prophylaxis Contraindicated. Sequential Compression Devices Ordered.    Mobility:   Basic Mobility Inpatient Raw Score: 17  JH-HLM Goal: 5: Stand one or more mins  JH-HLM Achieved: 5: Stand (1 or more minutes)  JH-HLM Goal achieved. Continue to encourage appropriate mobility.    Patient Centered Rounds: I performed bedside rounds with nursing staff today.   Discussions with Specialists or Other Care Team Provider: Discussed with cardiology    Education and Discussions with Family / Patient: Updated  (daughter) at bedside.    Total Time Spent on Date of Encounter in care of patient: >35 mins. This time was spent on one or more of the following: performing physical exam; counseling and coordination of care; obtaining or reviewing history; documenting in the medical record; reviewing/ordering tests, medications or procedures; communicating with other healthcare professionals and discussing with patient's family/caregivers.    Current Length of Stay: 1 day(s)  Current Patient Status: Inpatient    Certification Statement: The patient will continue to require additional inpatient hospital stay due to secondary to CHF  Discharge Plan: Anticipate discharge tomorrow to home.    Code Status: Level 1 - Full Code    Subjective:   Seen and examined no shortness of breath no hip pain    Objective:     Vitals:   Temp (24hrs), Av.7 °F (36.5 °C), Min:97.2 °F (36.2 °C), Max:98.6 °F (37 °C)    Temp:  [97.2 °F (36.2 °C)-98.6 °F (37 °C)] 97.2 °F (36.2 °C)  HR:  [43-58] 48  Resp:  [18] 18  BP: ()/(48-60) 102/48  SpO2:  [95 %-99 %] 97 %  Body mass index is 31.7 kg/m².     Input and Output Summary (last 24 hours):     Intake/Output Summary (Last 24 hours) at 2024 1019  Last data filed at 2024 0858  Gross per 24 hour   Intake 780 ml   Output 1050 ml   Net -270 ml       Physical Exam:   Physical Exam  Vitals and nursing note reviewed.   Constitutional:       General: She is not in acute distress.     Appearance: She is well-developed.   HENT:      Head: Normocephalic and atraumatic.   Eyes:      Conjunctiva/sclera: Conjunctivae normal.   Cardiovascular:      Rate and Rhythm: Normal rate. Rhythm irregular.      Heart sounds: No murmur heard.  Pulmonary:      Effort: Pulmonary effort is normal. No respiratory distress.      Breath sounds: Rales (Basilar) present.   Abdominal:      Palpations: Abdomen is soft.      Tenderness: There is no abdominal tenderness.   Musculoskeletal:         General: Swelling (Significant improvement but still mildly on the right) present.      Cervical back: Neck supple.   Skin:     General: Skin is warm and dry.      Capillary Refill: Capillary refill takes less than 2 seconds.   Neurological:      Mental Status: She is alert and oriented to person, place, and time.   Psychiatric:         Mood and Affect: Mood normal.          Additional Data:     Labs:  Results from last 7 days   Lab Units 24  0513   WBC Thousand/uL 2.72*   HEMOGLOBIN g/dL 8.9*   HEMATOCRIT % 29.4*    PLATELETS Thousands/uL 69*   SEGS PCT % 60   LYMPHO PCT % 24   MONO PCT % 11   EOS PCT % 4     Results from last 7 days   Lab Units 09/09/24  0513 09/08/24  0510 09/07/24  1826   SODIUM mmol/L 139   < > 139   POTASSIUM mmol/L 3.8   < > 4.4   CHLORIDE mmol/L 98   < > 97   CO2 mmol/L 38*   < > 35*   BUN mg/dL 27*   < > 24   CREATININE mg/dL 1.04   < > 1.07   ANION GAP mmol/L 3*   < > 7   CALCIUM mg/dL 9.1   < > 10.5*   ALBUMIN g/dL  --   --  4.4   TOTAL BILIRUBIN mg/dL  --   --  0.66   ALK PHOS U/L  --   --  41   ALT U/L  --   --  15   AST U/L  --   --  27   GLUCOSE RANDOM mg/dL 94   < > 113    < > = values in this interval not displayed.                 Results from last 7 days   Lab Units 09/09/24  0513   PROCALCITONIN ng/ml <0.05       Lines/Drains:  Invasive Devices       Peripheral Intravenous Line  Duration             Peripheral IV 09/07/24 Left Antecubital 1 day                          Imaging: Reviewed radiology reports from this admission including: chest xray    Recent Cultures (last 7 days):         Last 24 Hours Medication List:   Current Facility-Administered Medications   Medication Dose Route Frequency Provider Last Rate    allopurinol  200 mg Oral Daily Luther Velazquez DO      aspirin  81 mg Oral Daily Luther Velazquez DO      fluticasone  1 spray Nasal Daily Luther Velazquez DO      furosemide  40 mg Intravenous BID SUDHEER Bautista      meclizine  25 mg Oral Q8H PRN Luther Velazquez DO      pravastatin  40 mg Oral HS Luther Velazquez DO          Today, Patient Was Seen By: Zenaida Pérez MD    **Please Note: This note may have been constructed using a voice recognition system.**

## 2024-09-09 NOTE — PLAN OF CARE
Problem: PHYSICAL THERAPY ADULT  Goal: Performs mobility at highest level of function for planned discharge setting.  See evaluation for individualized goals.  Description: Treatment/Interventions: ADL retraining, Functional transfer training, LE strengthening/ROM, Elevations, Therapeutic exercise, Endurance training, Patient/family training, Equipment eval/education, Bed mobility, Gait training, Compensatory technique education, Spoke to nursing, Spoke to case management, OT  Equipment Recommended:  (walker-pt has)       See flowsheet documentation for full assessment, interventions and recommendations.  9/9/2024 1651 by Karla Alvarez PT  Note: Prognosis: Good  Problem List: Decreased strength, Decreased endurance, Impaired balance, Decreased mobility, Decreased safety awareness, Impaired hearing, Obesity  Pt tolerated session fairly well. She was able to complete repeated sit<>stands with and without UE support from chair. She requires min cues for tehcnique for proepr completion of LE TE. discussed increased ambulaiton with staff in room and hallway, pt verbalized understanding. discussed potential for OPPT services for endurance and balance. pt declining.        Barriers to Discharge Comments: pt has uspport and assistance from family. increased fall risk  Rehab Resource Intensity Level, PT: III (Minimum Resource Intensity) (handout provided for OPPT services. pt aware of freedom of choice. patient declining OPPt services reporting she does her exercises at home.)    See flowsheet documentation for full assessment.

## 2024-09-09 NOTE — CONSULTS
Consult Cardiology    Violetta Olson 5/31/1933, 89 y.o. female MRN: [id number]    Unit/Bed#: -01 Encounter: [id number]    Attending Provider: Zenaida Pérez MD   Primary Care Provider: Luis E Hdez DO   Date admitted to hospital: 9/7/2024       Inpatient consult to Cardiology  Consult performed by: SUDHEER Bautista  Consult ordered by: Luther Velazquez DO          * Acute on chronic diastolic (congestive) heart failure (HCC)  Assessment & Plan  Wt Readings from Last 3 Encounters:   09/09/24 89.1 kg (196 lb 6.4 oz)   06/04/24 89.7 kg (197 lb 12 oz)   03/31/21 94.8 kg (208 lb 15 oz)     History of HFpEF with valvular heart disease, pulmonary hypertension/portal hypertension with cirrhosis.  Exam is consistent with more of a right sided heart failure.  Echo today pending.  Currently diuresing well with IV furosemide 40 mg BID. Continue for another 24 hours.  Maintained on furosemide 60 mg BID outpatient.  Recommend transition to torsemide 60 mg daily at discharge.  She is scheduled with her outpatient cardiologist Dr. Rolon this Wednesday 9/11/2024.  Strict I's/O's, standing daily weights, sodium/fluid restriction.  Optimize electrolytes for K+ >4, Mag >2.    Pancytopenia (HCC)  Assessment & Plan  Off AC  Stable blood counts this admission  Platelet count today is 69.  Would recommend discontinuation of aspirin if platelet count < 50    Chronic respiratory failure with hypoxia (HCC)  Assessment & Plan  Secondary to HFrEF  Currently on home O2 requirement of 2 L NC    Longstanding persistent atrial fibrillation (HCC)  Assessment & Plan  History of longstanding persistent a fib.  Previously on Xarelto, but secondary to GI bleeding/anemia she underwent Watchman with #31 mm [employer] Flex Watchman device 2/9/2023 at [facility] in [place]  History of tachy-kalyn syndrome. Not maintained on AV hitesh blocking agents.  Currently remains in rate controlled a fib this  admission.    Essential hypertension  Assessment & Plan  Hypotensive today  Maintained on benazepril 10 mg daily outpatient, will place on hold today and monitor for resuming prior to discharge.  Diuresing with IV furosemide 40 mg BID        Physician Requesting Consult: Zenaida Pérez MD    Reason for Consult / Principal Problem: Acute on chronic HFpEF      HPI: Violetta Olson is a 89 y.o. year old female who has a history of permanent atrial fibrillation and tachy-kalyn syndrome (not maintained on AV hitesh blocking agents), GAVE with history of GI bleeding/anemia s/p Watchman insertion 2/2023 with discontinuation of Xarelto, valvular heart disease, HFpEF, pulmonary hypertension, chronic supplemental O2 use of 2 L, cirrhosis with portal hypertension, thrombocytopenia, HTN, HLD  who follows with cardiologist Dr. Dr. Pa Rolon at [facility].            Patient presented to [facility] ER on 9/7/2024 with progressive leg swelling. She had noticed symptoms for at least 1-2 weeks prior to admission. She was taking furosemide 60 mg BID faithfully at home. ECG showed rate controlled atrial fibrillation. HS trop neg. BNP elevated 385. Stable, chronic pancytopenia. Chest xray demonstrated vascular congestion. She was started on IV furosemide 40 mg BID. An updated echocardiogram and cardiology consult were requested.    At the time of my evaluation she is sitting up in bed breathing comfortably on 2 L NC. She notes significant improvement since admission. She states she does take her medications faithfully. She lives in her son's home. She gets around on one level and occasionally cooks. She states she tries to remain active. No recent bleeding issues. She is actually scheduled for an outpatient appointment with Dr. Rolon on Wednesday 9/11/2024.  Echo is pending.       Review of Systems   Constitutional:  Negative for chills and fever.   HENT:  Negative for ear pain and sore throat.    Eyes:  Negative for  "pain and visual disturbance.   Respiratory:  Negative for cough and shortness of breath.    Cardiovascular:  Positive for leg swelling. Negative for chest pain and palpitations.   Gastrointestinal:  Negative for abdominal pain and vomiting.   Genitourinary:  Negative for dysuria and hematuria.   Musculoskeletal:  Negative for arthralgias and back pain.   Skin:  Negative for color change and rash.   Neurological:  Negative for seizures and syncope.   All other systems reviewed and are negative.       Historical Information     Past Medical History:   Diagnosis Date    A-fib (HCC)     Dizziness     Edema     GAVE (gastric antral vascular ectasia)     with GI bleeding    GERD (gastroesophageal reflux disease)     Heart failure with preserved ejection fraction (HCC)     High cholesterol     Hypertension     Mitral valve disease      Past Surgical History:   Procedure Laterality Date    CARDIAC OTHER  02/09/2023    # 31mm [employer] Flex Watchman in [place]    CHOLECYSTECTOMY OPEN       Social History     Substance and Sexual Activity   Alcohol Use Not Currently     Social History     Substance and Sexual Activity   Drug Use Never     Social History     Tobacco Use   Smoking Status Never   Smokeless Tobacco Never       Family History: non-contributory    Meds/Allergies     current meds:   Current Facility-Administered Medications   Medication Dose Route Frequency    allopurinol (ZYLOPRIM) tablet 200 mg  200 mg Oral Daily    aspirin chewable tablet 81 mg  81 mg Oral Daily    fluticasone (FLONASE) 50 mcg/act nasal spray 1 spray  1 spray Nasal Daily    furosemide (LASIX) injection 40 mg  40 mg Intravenous BID    meclizine (ANTIVERT) tablet 25 mg  25 mg Oral Q8H PRN    pravastatin (PRAVACHOL) tablet 40 mg  40 mg Oral HS          No Known Allergies    Objective     Vitals: Blood pressure (!) 102/48, pulse (!) 48, temperature (!) 97.2 °F (36.2 °C), resp. rate 18, height 5' 6\" (1.676 m), weight 89.1 kg (196 lb 6.4 oz), SpO2 " 97%., Body mass index is 31.7 kg/m².    Orthostatic Blood Pressures      Flowsheet Row Most Recent Value   Blood Pressure 102/48 filed at 2024 1009   Patient Position - Orthostatic VS Sitting filed at 2024 0902            Systolic (24hrs), Av , Min:98 , Max:121     Diastolic (24hrs), Av, Min:48, Max:60        Intake/Output Summary (Last 24 hours) at 2024 1116  Last data filed at 2024 1103  Gross per 24 hour   Intake 780 ml   Output 1450 ml   Net -670 ml       Weight (last 2 days)       Date/Time Weight    24 0515 89.1 (196.4)    24 0600 88.7 (195.6)    24 2059 92.2 (203.26)    24 1823 93.1 (205.25)            Invasive Devices       Peripheral Intravenous Line  Duration             Peripheral IV 24 Left Antecubital 1 day                    Physical Exam  Vitals and nursing note reviewed.   Constitutional:       General: She is not in acute distress.     Appearance: She is well-developed.   HENT:      Head: Normocephalic and atraumatic.   Eyes:      Conjunctiva/sclera: Conjunctivae normal.   Neck:      Vascular: No JVD.   Cardiovascular:      Rate and Rhythm: Normal rate and regular rhythm.      Heart sounds: Murmur heard.      Systolic murmur is present.   Pulmonary:      Effort: Pulmonary effort is normal. No respiratory distress.      Breath sounds: Examination of the right-lower field reveals rales. Examination of the left-lower field reveals rales. Rales present.      Comments: 2 L NC supplemental O2  Abdominal:      Palpations: Abdomen is soft.      Tenderness: There is no abdominal tenderness.   Musculoskeletal:         General: No swelling.      Cervical back: Neck supple.      Right lower le+ Edema present.      Left lower le+ Edema present.   Skin:     General: Skin is warm and dry.      Capillary Refill: Capillary refill takes less than 2 seconds.   Neurological:      Mental Status: She is alert.   Psychiatric:         Mood and Affect: Mood  normal.              Laboratory Results:          CBC with diff:   Results from last 7 days   Lab Units 09/09/24  0513 09/08/24  0510 09/07/24  1826   WBC Thousand/uL 2.72* 2.55* 3.76*   HEMOGLOBIN g/dL 8.9* 8.6* 9.9*   HEMATOCRIT % 29.4* 27.6* 31.4*   MCV fL 102* 100* 100*   PLATELETS Thousands/uL 69* 62* 83*   RBC Million/uL 2.88* 2.76* 3.14*   MCH pg 30.9 31.2 31.5   MCHC g/dL 30.3* 31.2* 31.5   RDW % 15.3* 15.5* 15.3*   MPV fL 11.3 10.4 11.3   NRBC AUTO /100 WBCs 0  --  0         CMP:  Results from last 7 days   Lab Units 09/09/24 0513 09/08/24  0510 09/07/24  1826   POTASSIUM mmol/L 3.8 3.8 4.4   CHLORIDE mmol/L 98 99 97   CO2 mmol/L 38* 38* 35*   BUN mg/dL 27* 21 24   CREATININE mg/dL 1.04 0.95 1.07   CALCIUM mg/dL 9.1 9.4 10.5*   AST U/L  --   --  27   ALT U/L  --   --  15   ALK PHOS U/L  --   --  41   EGFR ml/min/1.73sq m 47 52 45       BMP:  Results from last 7 days   Lab Units 09/09/24  0513 09/08/24  0510 09/07/24  1826   POTASSIUM mmol/L 3.8 3.8 4.4   CHLORIDE mmol/L 98 99 97   CO2 mmol/L 38* 38* 35*   BUN mg/dL 27* 21 24   CREATININE mg/dL 1.04 0.95 1.07   CALCIUM mg/dL 9.1 9.4 10.5*       BNP:    Recent Labs     09/07/24  1826   *       Magnesium:   Results from last 7 days   Lab Units 09/09/24 0513 09/08/24  0510   MAGNESIUM mg/dL 1.9 1.9       TSH:   2.91 in 9/14/2023      Cardiac testing:     Reviewed records from [facility] Everywhere, including transthoracic echo report 2022 and ALEX's done 2/2023 and 2/2024    Imaging: I have personally reviewed pertinent reports.      XR chest 1 view portable    Result Date: 9/8/2024  Narrative: XR CHEST PORTABLE INDICATION: SOB. COMPARISON: 3/27/2021 FINDINGS: Pulmonary vascular congestion. No pneumothorax or pleural effusion. Enlarged cardiac silhouette, unchanged. No acute osseous abnormality within the limitations of portable radiography. Normal upper abdomen.     Impression: Enlarged cardiac silhouette. Pulmonary vascular  congestion. Workstation performed: [id number]       EKG reviewed personally: EKG: Atrial fibrillation with ventricular rate 71 bpm. RAD. T wave abnormality in inferior leads.       Code Status: Level 1 - Full Code

## 2024-09-09 NOTE — PHYSICAL THERAPY NOTE
"   PHYSICAL THERAPY EVALUATION  NAME:  Violetta Olson  DATE: 09/09/24    AGE:   89 y.o.  Mrn:   [id number]  ADMIT DX:  Leg swelling [M79.89]  Acute on chronic congestive heart failure, unspecified heart failure type (HCC) [I50.9]    Past Medical History:   Diagnosis Date    A-fib (HCC)     Dizziness     Edema     GAVE (gastric antral vascular ectasia)     with GI bleeding    GERD (gastroesophageal reflux disease)     Heart failure with preserved ejection fraction (HCC)     High cholesterol     Hypertension     Mitral valve disease      Length Of Stay: 1  Performed at least 2 patient identifiers during session: Name and Birthday  PHYSICAL THERAPY EVALUATION :    09/09/24 1444   PT Last Visit   PT Visit Date 09/09/24   Note Type   Note type Evaluation   Pain Assessment   Pain Assessment Tool 0-10   Pain Score No Pain   Restrictions/Precautions   Other Precautions Chair Alarm;Bed Alarm;Fall Risk;O2;Hard of hearing  (2 lpm NC)   Home Living   Type of Home House  ([facility])   Home Layout Two level;Performs ADLs on one level;Able to live on main level with bedroom/bathroom   Bathroom Shower/Tub Walk-in shower   Bathroom Toilet Raised   Bathroom Equipment Shower chair  (pt notes the shower chair is too big and she does not use it; daughter comes down in the morning while pt showers to provide supervision and assist as needed. sits on a chair right outside the shower)   Home Equipment Walker;Hospital bed  (pt has rollator and RW; she primarily uses rollator during the day, she uses RW for overnight equipped with a flash light and \"crow caller\" that she can blow if she needs her son. has wheelchair upstairs in attic.also hasrecliner lift chair)   Additional Comments lives with her son in a two story home but pt remains on first floor, 1 small step to enter the home. [place] and her son goes out to work on the farm during the day but is around if needed. Pt also reports her daughter lives 5 minutes away and is " "retired and comes to check in on her and help as needed.   Prior Function   Level of [place] Independent with functional mobility;Needs assistance with ADLs;Independent with IADLS  (c rollator vs RW, has A for socks and bra but otherwise completes ADL tasks for self)   Lives With Son   Receives Help From Family   IADLs Independent with medication management;Family/Friend/Other provides transportation;Family/Friend/Other provides meals  (son also assists with cooking; pt completes her own laundry, daughter or son provides transportation. pt doesn't use the stove.)   Falls in the last 6 months 0   Comments Reports being independent with mobility, has assistance with ADLs prn.   General   Family/Caregiver Present Yes  (dtr, Kanika)   Cognition   Orientation Level Oriented X4   Following Commands Follows one step commands without difficulty   Comments [place], requires repeated cues at times, has B hearing aides   Subjective   Subjective \"I do go walking without the walker. I park it in the kitchen and then walk without it.\" (pt reaches for furniture)   RLE Assessment   RLE Assessment WFL  (3+/5)   LLE Assessment   LLE Assessment WFL  (3+/5)   Coordination   Rapid Alternating Movements Intact   Light Touch   RLE Light Touch Grossly intact   LLE Light Touch Grossly intact   Bed Mobility   Additional Comments Pt sitting OOB in recliner chair upon arrival to room and at end of session.   Transfers   Sit to Stand 5  Supervision   Additional items Increased time required;Verbal cues   Stand to Sit 5  Supervision   Additional items Increased time required;Verbal cues   Stand pivot   (SBA)   Additional items Increased time required;Verbal cues   Additional Comments use of RW. min verbal cues for hand placement for safety with RW. sit<>stand with supervision with inc time. wide LAKISHA. spt with SBA with min cues for turign completely with RW throughout as patient abandoned RW at times. min cues for direction turning with O2 line " "management.   Ambulation/Elevation   Gait pattern Wide LAKISHA;Short stride;Excessively slow;Step through pattern   Gait Assistance   (SBA--->close supervision)   Additional items Assist x 1;Verbal cues   Assistive Device Rolling walker   Distance ambulated 110'x1 and 40'x1 wiht RW with SBA-->close supervision with slow michael, decreased step length with min cues for inc step length. pt with min FOOTE. Spo2 >/= 90% on 2 lpm NC throughout.   Stair Management Assistance   (steadying assistance)   Additional items Assist x 1;Verbal cues;Increased time required   Stair Management Technique Two rails;Foreward;Step to pattern   Number of Stairs 5   Ambulation/Elevation Additional Comments reports having 2 people when completing stairs. ascended with recirpcal pattern and descended with step to pattern. inc time to complete. steadying assistance for balance and safety.   Balance   Static Sitting Good   Dynamic Sitting Fair +   Static Standing Fair   Dynamic Standing Fair -   Ambulatory Fair -   Endurance Deficit   Endurance Deficit Yes   Endurance Deficit Description SpO2 on 2 lpm NC at start of session 96-98%. pt reports she'd like to be off O2 and unsure if she is supposed to wear continuously. reports physician told her she can take it off when sitting. Spo2 trialed on room air at rest. Spo2 noted to be at 89-90%. maintain O2 >/= 90% per notes. O2 reapplied at 2 lpm. Spo2 prior to mobility to 97%. with activity, desaturated to 90% with min FOOTE with ambulation and stairs. min cues for pursed lip breathing. O2 increased to >/= 93% at end of session on 2 lpm NC.   Activity Tolerance   Activity Tolerance Patient tolerated treatment well   Assessment   Prognosis Good   Problem List Decreased strength;Decreased endurance;Impaired balance;Decreased mobility;Decreased safety awareness;Impaired hearing;Obesity   Barriers to Discharge Comments pt has uspport and assistance from family. increased fall risk   Goals   Patient Goals \"Go " "home\"   [facility] Expiration Date 09/23/24   PT Treatment Day 1   Plan   Treatment/Interventions ADL retraining;Functional transfer training;LE strengthening/ROM;Elevations;Therapeutic exercise;Endurance training;Patient/family training;Equipment eval/education;Bed mobility;Gait training;Compensatory technique education;Spoke to nursing;Spoke to case management;OT   PT Frequency 2-3x/wk   Discharge Recommendation   Rehab Resource Intensity Level, PT III (Minimum Resource Intensity)  (handout provided for OPPT services. pt aware of freedom of choice. patient declining OPPt services reporting she does her exercises at home.)   Equipment Recommended   (walker-pt has)   Additional Comments continued support and assistance from family prn   AM-PAC Basic Mobility Inpatient   Turning in Flat Bed Without Bedrails 3   Lying on Back to Sitting on Edge of Flat Bed Without Bedrails 3   Moving Bed to Chair 3   Standing Up From Chair Using Arms 3   Walk in Room 3   Climb 3-5 Stairs With Railing 3   Basic Mobility Inpatient Raw Score 18   Basic Mobility Standardized Score 41.05   [facility] Highest Level Of Mobility   -HLM Goal 6: Walk 10 steps or more   -HLM Achieved 7: Walk 25 feet or more   Additional Treatment Session   Start Time 1510   End Time 1520   Treatment Assessment Pt tolerated session fairly well. She was able to complete repeated sit<>stands with and without UE support from chair. She requires min cues for tehcnique for proepr completion of LE TE. discussed increased ambulaiton with staff in room and hallway, pt verbalized understanding. discussed potential for OPPT services for endurance and balance. pt declining.   Equipment Use patient demonstrating standing exercises to therapist with b UE support on RW. exercises consisting of ankle DF/PF, HS curn, hip abd/add, marching and mini squats. min manual cues for proper completion of minisquats to avoid anterior translation of knees over toes. then completed 2 sets of " 7 repeated sit<>stands wiht min cues for hand placement for safety with RWand progression to no UE use at times. discussed completing 5 increasing to 10 as tolerated. pt and daughter presetn verbalizing understanding.   End of Consult   Patient Position at End of Consult Bedside chair;Bed/Chair alarm activated;All needs within reach       (Please find full objective findings from PT assessment regarding body systems outlined above).     Assessment: Pt is a 89 y.o. female seen for PT evaluation s/p admission to [facility] on 9/7/2024 with Acute on chronic diastolic (congestive) heart failure (HCC).  Order placed for PT services.  Upon evaluation: Pt is presenting with impaired functional mobility due to decreased strength, decreased endurance, impaired balance, gait deviations, decreased safety awareness, impaired hearing, and fall risk requiring  supervision to stand by assistance for transfers and stand by to close supervision assistance for ambulation with RW . Pt's clinical presentation is currently unpredictable given the functional mobility deficits above, especially weakness, decreased endurance, impaired balance, gait deviations, decreased activity tolerance, decreased functional mobility tolerance, decreased safety awareness, and SOB upon exertion, coupled with fall risks as indicated by AM-PAC 6-Clicks: 18/24 as well as impaired balance, polypharmacy, decreased safety awareness, and obesity and combined with medical complications of bradycardia, abnormal H&H, abnormal WBCs, abnormal CO2 values, need for input for mobility technique/safety, and acute CHF, pancytopenia .  Pt's PMHx and comorbidities that may affect physical performance and progress include: CHF and liver cirrhosis, chronic respiratory failure with hypoxia on 2 lpm NC, gout, Afib s/p Watchman, HTN, obesity, CAD . Personal factors affecting pt at time of IE include: step(s) to enter environment, advanced age, inability to  perform IADLs, inability to perform ADLs, and inability to navigate level surfaces without external assistance. Pt will benefit from continued skilled PT services to address deficits as defined above and to maximize level of functional mobility to facilitate return toward PLOF and improved QOL. From PT/mobility standpoint, recommendation at time of d/c would be Level III (Minimum Resource Intensity), with family and/or caregiver support, and with walker in order to reduce fall risk and maximize pt's functional independence and consistency with mobility. Recommend trial with walker next 1-2 sessions and ther ex next 1-2 sessions.       The patient's AM-PAC Basic Mobility Inpatient Short Form Raw Score is 18. A Raw score of greater than 16 suggests the patient may benefit from discharge to home. Please also refer to the recommendation of the Physical Therapist for safe discharge planning.       Goals: Pt will: Perform bed mobility tasks with modified Independent to reposition in bed and prepare for transfers. Pt will perform transfers with modified Independent to decrease burden of care, decrease risk for falls, improve activity tolerance, and improve gait quality and prepare for ambulation. Pt will ambulate with RW for >/= 200' with  modified Independent  to decrease burden of care, decrease risk for falls, improve activity tolerance, and improve gait quality and to access home environment. Pt will complete 1 step with LRAD and >/= 4 steps with bilateral handrails with supervision to decrease burden of care, return to home with KATY, decrease risk for falls, and improve activity tolerance. Pt will participate in objective balance assessment to determine baseline fall risk. Pt will participate in SSWS assessment to determine level of mobility. Pt will increase B LE strength >/= 1/2 MMT grade to facilitate functional mobility.      Karla Alvarez, PT,DPT

## 2024-09-09 NOTE — ASSESSMENT & PLAN NOTE
Off AC  Stable blood counts this admission  Platelet count today is 69.  Would recommend discontinuation of aspirin if platelet count < 50

## 2024-09-09 NOTE — UTILIZATION REVIEW
NOTIFICATION OF INPATIENT ADMISSION   AUTHORIZATION REQUEST   SERVICING FACILITY:   [place] [address]  Tax ID: [id number]  NPI: [id number] ATTENDING PROVIDER:  Attending Name and NPI#: Zenaida Pérez Md [[id number]]  Address: [address] [place] [address]  Phone: [phone]   ADMISSION INFORMATION:  Place of Service: Inpatient [facility]  Place of Service Code: 21  Inpatient Admission Date/Time: 9/8/24 10:15 AM  Discharge Date/Time: No discharge date for patient encounter.  Admitting Diagnosis Code/Description:  Leg swelling [M79.89]  Acute on chronic congestive heart failure, unspecified heart failure type (HCC) [I50.9]     UTILIZATION REVIEW CONTACT:  Michelle Jacobo Utilization   Network Utilization Review Department  Phone: [phone]  Fax [phone]  Email: [email or web address]  Contact for approvals/pending authorizations, clinical reviews, and discharge.     PHYSICIAN ADVISORY SERVICES:  Medical Necessity Denial & [facility]ae-g[facility] Review  Phone: [phone]  Fax: [phone]  Email: [email or web address]     DISCHARGE SUPPORT TEAM:  For Patients Discharge Needs & Updates  Phone: [phone] opt. 2 Fax: [phone]  Email: [email or web address]

## 2024-09-09 NOTE — CASE MANAGEMENT
Case Management Assessment & Discharge Planning Note    Patient name Violetta Olson  Location /-01 MRN [id number]  : 1933 Date 2024       Current Admission Date: 2024  Current Admission Diagnosis:Acute on chronic diastolic (congestive) heart failure (HCC)   Patient Active Problem List    Diagnosis Date Noted Date Diagnosed    Liver cirrhosis (HCC) 2024     Pancytopenia (HCC) 2024     Acute on chronic diastolic (congestive) heart failure (HCC) 2024     Chronic respiratory failure with hypoxia (HCC) 2024     Acute respiratory failure with hypoxia (HCC) 2021     Morbid obesity (HCC) 2021     Essential hypertension 2021     Mixed hyperlipidemia 2021     Longstanding persistent atrial fibrillation (HCC) 2021     Other secondary gout, multiple sites 2021       LOS (days): 1  Geometric Mean LOS (GMLOS) (days): 3.9  Days to GMLOS:2.8     OBJECTIVE:    Risk of Unplanned Readmission Score: 12.97         Current admission status: Inpatient  Referral Reason: Other (Disposition Planning)    Preferred Pharmacy:   [address] [address]  Phone: [phone] Fax: [phone]    Primary Care Provider: Luis E Hdez DO    Primary Insurance: [employer] [facility]  Secondary Insurance:     ASSESSMENT:  Active Health Care Proxies    There are no active Health Care Proxies on file.       Advance Directives  Does patient have a Health Care POA?: Yes  Does patient have Advance Directives?: Yes  Advance Directives: Living will, Power of  for health care  Primary Contact: Lemuel Olson, son         Readmission Root Cause  30 Day Readmission: No    Patient Information  Admitted from:: Home  Mental Status: Alert  During Assessment patient was accompanied by: Daughter  Assessment information provided by:: Patient, Daughter  Primary Caregiver:  Child  Caregiver's Name:: gareth Boudreaux  Caregiver's Relationship to Patient:: Family Member  Caregiver's Telephone Number:: [phone] (M)  Support Systems: Son, Daughter  County of Residence: [facility]  What city do you live in?: [place]  Home entry access options. Select all that apply.: Stairs  Number of steps to enter home.: 1  Do the steps have railings?: No (pt holds onto porch post)  Type of Current Residence: 2 [place] home  Upon entering residence, is there a bedroom on the main floor (no further steps)?: Yes  Upon entering residence, is there a bathroom on the main floor (no further steps)?: Yes  Living Arrangements: Lives w/ Son  Is patient a ?: No    Activities of Daily Living Prior to Admission  Functional Status: Assistance  Completes ADLs independently?: No  Level of ADL dependence: Assistance  Ambulates independently?: Yes  Does patient use assisted devices?: Yes  Assisted Devices (DME) used: Hospital Bed, Rollator, Home Oxygen concentrator, Portable Oxygen tanks  DME Company Name (respiratory supplies): [employer]  O2 Rate(s): 2L  Does patient currently own DME?: Yes  What DME does the patient currently own?: Home Oxygen concentrator, Portable Oxygen tanks, Hospital Bed, Rollator, Walker  Does patient have a history of Outpatient Therapy (PT/OT)?: Yes  Does the patient have a history of Short-Term Rehab?: No  Does patient have a history of HHC?: Yes (unknown agency)  Does patient currently have HHC?: No         Patient Information Continued  Income Source: SSI/SSD  Does patient have prescription coverage?: Yes  Does patient receive dialysis treatments?: No  Does patient have a history of substance abuse?: No  Does patient have a history of Mental Health Diagnosis?: No         Means of Transportation  Means of Transport to Appts:: Family transport      Social Determinants of Health (SDOH)      Flowsheet Row Most Recent Value   Housing Stability    In the last 12 months, was there  a time when you were not able to pay the mortgage or rent on time? N   In the past 12 months, how many times have you moved where you were living? 0   At any time in the past 12 months, were you homeless or living in a shelter (including now)? N   Transportation Needs    In the past 12 months, has lack of transportation kept you from medical appointments or from getting medications? no   In the past 12 months, has lack of transportation kept you from meetings, work, or from getting things needed for daily living? No   Food Insecurity    Within the past 12 months, you worried that your food would run out before you got the money to buy more. Never true   Within the past 12 months, the food you bought just didn't last and you didn't have money to get more. Never true   Utilities    In the past 12 months has the electric, gas, oil, or water company threatened to shut off services in your home? No            DISCHARGE DETAILS:    Discharge planning discussed with:: patient and daughterKanika  Freedom of Choice: Yes  Comments - Freedom of Choice: [facility] referral for [facility]  CM contacted family/caregiver?: Yes             Contacts  Patient Contacts: Kanika Carney  Relationship to Patient:: Family  Contact Method: In Person  Reason/Outcome: Discharge Planning, Continuity of Care              CM met with patient and daughter Kanika at the bedside, baseline information was obtained. CM discussed the role of CM in helping the patient develop a discharge plan and assist the patient in carry out their plan.    Patient lives in 2 story home with son, Lemuel. Patient has first floor set up with hospital bed and walk in shower w. Small lip to get inside. Patient ambulates independently with rollator or RW.  Patient typically carries a crow call with her in her rollator basket in case she needs to blow it and call her son for assistance.  She also has a blow horn but says this is much bigger and does not carry this item with her  typically.  Son works out on the farm during the day, caring for his chickens and doing field work. Son assists patient with ADLs.  If patient requires assistance with bathing, however, patient will call her daughter, Kanika, to come to the house and assist. Patient typically wears O2 at all times unless she has to go near the stove at home then she removes. CM and daughter, Kanika, discussed patient POC through [employer] and daughter reported bag to be pretty hefty in size requiring daughter or son to carry when patient has to go outside the home. Daughter inquired on [employer] and CM discussed [employer] being out of pocket expense for patient, without assistance of insurance. Daughter understood.      CM will follow for CM discharge referral needs.

## 2024-09-09 NOTE — PLAN OF CARE
Problem: PAIN - ADULT  Goal: Verbalizes/displays adequate comfort level or baseline comfort level  Description: Interventions:  - Encourage patient to monitor pain and request assistance  - Assess pain using appropriate pain scale  - Administer analgesics based on type and severity of pain and evaluate response  - Implement non-pharmacological measures as appropriate and evaluate response  - Consider cultural and social influences on pain and pain management  - Notify physician/advanced practitioner if interventions unsuccessful or patient reports new pain  Outcome: Progressing     Problem: INFECTION - ADULT  Goal: Absence or prevention of progression during hospitalization  Description: INTERVENTIONS:  - Assess and monitor for signs and symptoms of infection  - Monitor lab/diagnostic results  - Monitor all insertion sites, i.e. indwelling lines, tubes, and drains  - Monitor endotracheal if appropriate and nasal secretions for changes in amount and color  - [place] appropriate cooling/warming therapies per order  - Administer medications as ordered  - Instruct and encourage patient and family to use good hand hygiene technique  - Identify and instruct in appropriate isolation precautions for identified infection/condition  Outcome: Progressing  Goal: Absence of fever/infection during neutropenic period  Description: INTERVENTIONS:  - Monitor WBC    Outcome: Progressing     Problem: SAFETY ADULT  Goal: Patient will remain free of falls  Description: INTERVENTIONS:  - Educate patient/family on patient safety including physical limitations  - Instruct patient to call for assistance with activity   - Consult OT/PT to assist with strengthening/mobility   - Keep Call bell within reach  - Keep bed low and locked with side rails adjusted as appropriate  - Keep care items and personal belongings within reach  - Initiate and maintain comfort rounds  - Make Fall Risk Sign visible to staff  - Offer Toileting every 2 Hours,  in advance of need  - Initiate/Maintain bed alarm  - Obtain necessary fall risk management equipment: non skid socks  - Apply yellow socks and bracelet for high fall risk patients  - Consider moving patient to room near nurses station  Outcome: Progressing  Goal: Maintain or return to baseline ADL function  Description: INTERVENTIONS:  -  Assess patient's ability to carry out ADLs; assess patient's baseline for ADL function and identify physical deficits which impact ability to perform ADLs (bathing, care of mouth/teeth, toileting, grooming, dressing, etc.)  - Assess/evaluate cause of self-care deficits   - Assess range of motion  - Assess patient's mobility; develop plan if impaired  - Assess patient's need for assistive devices and provide as appropriate  - Encourage maximum independence but intervene and supervise when necessary  - Involve family in performance of ADLs  - Assess for home care needs following discharge   - Consider OT consult to assist with ADL evaluation and planning for discharge  - Provide patient education as appropriate  Outcome: Progressing  Goal: Maintains/Returns to pre admission functional level  Description: INTERVENTIONS:  - Perform AM-PAC 6 Click Basic Mobility/ Daily Activity assessment daily.  - Set and communicate daily mobility goal to care team and patient/family/caregiver.   - Collaborate with rehabilitation services on mobility goals if consulted  - Perform Range of Motion 3 times a day.  - Reposition patient every 2 hours.  - Dangle patient 3 times a day  - Stand patient 3 times a day  - Ambulate patient 3 times a day  - Out of bed to chair 3 times a day   - Out of bed for meals 3 times a day  - Out of bed for toileting  - Record patient progress and toleration of activity level   Outcome: Progressing     Problem: DISCHARGE PLANNING  Goal: Discharge to home or other facility with appropriate resources  Description: INTERVENTIONS:  - Identify barriers to discharge w/patient and  caregiver  - Arrange for needed discharge resources and transportation as appropriate  - Identify discharge learning needs (meds, wound care, etc.)  - Arrange for interpretive services to assist at discharge as needed  - Refer to Case Management Department for coordinating discharge planning if the patient needs post-hospital services based on physician/advanced practitioner order or complex needs related to functional status, cognitive ability, or social support system  Outcome: Progressing     Problem: Knowledge Deficit  Goal: Patient/family/caregiver demonstrates understanding of disease process, treatment plan, medications, and discharge instructions  Description: Complete learning assessment and assess knowledge base.  Interventions:  - Provide teaching at level of understanding  - Provide teaching via preferred learning methods  Outcome: Progressing     Problem: CARDIOVASCULAR - ADULT  Goal: Maintains optimal cardiac output and hemodynamic stability  Description: INTERVENTIONS:  - Monitor I/O, vital signs and rhythm  - Monitor for S/S and trends of decreased cardiac output SOB, leg swelling,  - Administer and titrate ordered vasoactive medications to optimize hemodynamic stability  - Assess quality of pulses, skin color and temperature  - Assess for signs of decreased coronary artery perfusion  - Instruct patient to report change in severity of symptoms  Outcome: Progressing  Goal: Absence of cardiac dysrhythmias or at baseline rhythm  Description: INTERVENTIONS:  - Continuous cardiac monitoring, vital signs, obtain 12 lead EKG if ordered  - Administer antiarrhythmic and heart rate control medications as ordered  - Monitor electrolytes and administer replacement therapy as ordered  Outcome: Progressing

## 2024-09-09 NOTE — ASSESSMENT & PLAN NOTE
Soft blood pressures and bradycardic patient apparently is not on Norvasc at home and not on atenolol we will discontinue both lisinopril will be on hold while on diuresis

## 2024-09-10 VITALS
SYSTOLIC BLOOD PRESSURE: 152 MMHG | DIASTOLIC BLOOD PRESSURE: 63 MMHG | WEIGHT: 194.4 LBS | HEART RATE: 65 BPM | OXYGEN SATURATION: 97 % | RESPIRATION RATE: 18 BRPM | TEMPERATURE: 97.9 F | HEIGHT: 66 IN | BODY MASS INDEX: 31.24 KG/M2

## 2024-09-10 LAB
ANION GAP SERPL CALCULATED.3IONS-SCNC: 3 MMOL/L (ref 4–13)
BUN SERPL-MCNC: 28 MG/DL (ref 5–25)
CALCIUM SERPL-MCNC: 8.8 MG/DL (ref 8.4–10.2)
CHLORIDE SERPL-SCNC: 98 MMOL/L (ref 96–108)
CO2 SERPL-SCNC: 39 MMOL/L (ref 21–32)
CREAT SERPL-MCNC: 1.01 MG/DL (ref 0.6–1.3)
GFR SERPL CREATININE-BSD FRML MDRD: 48 ML/MIN/1.73SQ M
GLUCOSE SERPL-MCNC: 92 MG/DL (ref 65–140)
POTASSIUM SERPL-SCNC: 3.8 MMOL/L (ref 3.5–5.3)
SODIUM SERPL-SCNC: 140 MMOL/L (ref 135–147)

## 2024-09-10 PROCEDURE — 99239 HOSP IP/OBS DSCHRG MGMT >30: CPT | Performed by: FAMILY MEDICINE

## 2024-09-10 PROCEDURE — 80048 BASIC METABOLIC PNL TOTAL CA: CPT | Performed by: FAMILY MEDICINE

## 2024-09-10 PROCEDURE — 99232 SBSQ HOSP IP/OBS MODERATE 35: CPT | Performed by: INTERNAL MEDICINE

## 2024-09-10 RX ORDER — TORSEMIDE 20 MG/1
60 TABLET ORAL DAILY
Qty: 90 TABLET | Refills: 0 | Status: SHIPPED | OUTPATIENT
Start: 2024-09-11 | End: 2024-10-11

## 2024-09-10 RX ORDER — TORSEMIDE 20 MG/1
60 TABLET ORAL DAILY
Status: DISCONTINUED | OUTPATIENT
Start: 2024-09-10 | End: 2024-09-10 | Stop reason: HOSPADM

## 2024-09-10 RX ADMIN — ALLOPURINOL 200 MG: 100 TABLET ORAL at 09:16

## 2024-09-10 RX ADMIN — TORSEMIDE 60 MG: 20 TABLET ORAL at 09:16

## 2024-09-10 RX ADMIN — ASPIRIN 81 MG 81 MG: 81 TABLET ORAL at 09:17

## 2024-09-10 NOTE — ASSESSMENT & PLAN NOTE
Wt Readings from Last 3 Encounters:   09/10/24 88.2 kg (194 lb 6.4 oz)   06/04/24 89.7 kg (197 lb 12 oz)   03/31/21 94.8 kg (208 lb 15 oz)     Patient presents today (9/7) with complaints of persistent/progressive shortness of breath as well as bilateral lower extremity edema over the past several days  Patient reports compliance with home medication regimen including Lasix twice daily-follows regularly with cardiology in the outpatient setting, [facility] cardiology  Patient without complaints of chest pressure or palpitations    Patient received IV diuretics with good diuretic effects, which improved conditions clinically.  Cardiology evaluate the patient.  Echocardiogram which was done on 9/9/2024 shows ejection fraction 65% with mild AS, mild to moderate MR, mild MS, moderate TR with PASP 75 to 80 mmHg.  Per cardiology recommendation, patient is discharging with torsemide 60 mg daily, patient has upcoming appointment with her cardiologist Dr. Carpenter on 9/11/2024.

## 2024-09-10 NOTE — PLAN OF CARE
Problem: PAIN - ADULT  Goal: Verbalizes/displays adequate comfort level or baseline comfort level  Description: Interventions:  - Encourage patient to monitor pain and request assistance  - Assess pain using appropriate pain scale  - Administer analgesics based on type and severity of pain and evaluate response  - Implement non-pharmacological measures as appropriate and evaluate response  - Consider cultural and social influences on pain and pain management  - Notify physician/advanced practitioner if interventions unsuccessful or patient reports new pain  Outcome: Progressing     Problem: INFECTION - ADULT  Goal: Absence or prevention of progression during hospitalization  Description: INTERVENTIONS:  - Assess and monitor for signs and symptoms of infection  - Monitor lab/diagnostic results  - Monitor all insertion sites, i.e. indwelling lines, tubes, and drains  - Monitor endotracheal if appropriate and nasal secretions for changes in amount and color  - [place] appropriate cooling/warming therapies per order  - Administer medications as ordered  - Instruct and encourage patient and family to use good hand hygiene technique  - Identify and instruct in appropriate isolation precautions for identified infection/condition  Outcome: Progressing     Problem: SAFETY ADULT  Goal: Patient will remain free of falls  Description: INTERVENTIONS:  - Educate patient/family on patient safety including physical limitations  - Instruct patient to call for assistance with activity   - Consult OT/PT to assist with strengthening/mobility   - Keep Call bell within reach  - Keep bed low and locked with side rails adjusted as appropriate  - Keep care items and personal belongings within reach  - Initiate and maintain comfort rounds  - Make Fall Risk Sign visible to staff  - Apply yellow socks and bracelet for high fall risk patients  - Consider moving patient to room near nurses station  Outcome: Progressing  Goal: Maintain or  return to baseline ADL function  Description: INTERVENTIONS:  -  Assess patient's ability to carry out ADLs; assess patient's baseline for ADL function and identify physical deficits which impact ability to perform ADLs (bathing, care of mouth/teeth, toileting, grooming, dressing, etc.)  - Assess/evaluate cause of self-care deficits   - Assess range of motion  - Assess patient's mobility; develop plan if impaired  - Assess patient's need for assistive devices and provide as appropriate  - Encourage maximum independence but intervene and supervise when necessary  - Involve family in performance of ADLs  - Assess for home care needs following discharge   - Consider OT consult to assist with ADL evaluation and planning for discharge  - Provide patient education as appropriate  Outcome: Progressing  Goal: Maintains/Returns to pre admission functional level  Description: INTERVENTIONS:  - Perform AM-PAC 6 Click Basic Mobility/ Daily Activity assessment daily.  - Set and communicate daily mobility goal to care team and patient/family/caregiver.   - Collaborate with rehabilitation services on mobility goals if consulted  - Out of bed for toileting  - Record patient progress and toleration of activity level   Outcome: Progressing     Problem: DISCHARGE PLANNING  Goal: Discharge to home or other facility with appropriate resources  Description: INTERVENTIONS:  - Identify barriers to discharge w/patient and caregiver  - Arrange for needed discharge resources and transportation as appropriate  - Identify discharge learning needs (meds, wound care, etc.)  - Arrange for interpretive services to assist at discharge as needed  - Refer to Case Management Department for coordinating discharge planning if the patient needs post-hospital services based on physician/advanced practitioner order or complex needs related to functional status, cognitive ability, or social support system  Outcome: Progressing     Problem: Knowledge  Deficit  Goal: Patient/family/caregiver demonstrates understanding of disease process, treatment plan, medications, and discharge instructions  Description: Complete learning assessment and assess knowledge base.  Interventions:  - Provide teaching at level of understanding  - Provide teaching via preferred learning methods  Outcome: Progressing     Problem: CARDIOVASCULAR - ADULT  Goal: Maintains optimal cardiac output and hemodynamic stability  Description: INTERVENTIONS:  - Monitor I/O, vital signs and rhythm  - Monitor for S/S and trends of decreased cardiac output SOB, leg swelling,  - Administer and titrate ordered vasoactive medications to optimize hemodynamic stability  - Assess quality of pulses, skin color and temperature  - Assess for signs of decreased coronary artery perfusion  - Instruct patient to report change in severity of symptoms  Outcome: Progressing  Goal: Absence of cardiac dysrhythmias or at baseline rhythm  Description: INTERVENTIONS:  - Continuous cardiac monitoring, vital signs, obtain 12 lead EKG if ordered  - Administer antiarrhythmic and heart rate control medications as ordered  - Monitor electrolytes and administer replacement therapy as ordered  Outcome: Progressing

## 2024-09-10 NOTE — PLAN OF CARE
Problem: PAIN - ADULT  Goal: Verbalizes/displays adequate comfort level or baseline comfort level  Description: Interventions:  - Encourage patient to monitor pain and request assistance  - Assess pain using appropriate pain scale  - Administer analgesics based on type and severity of pain and evaluate response  - Implement non-pharmacological measures as appropriate and evaluate response  - Consider cultural and social influences on pain and pain management  - Notify physician/advanced practitioner if interventions unsuccessful or patient reports new pain  Outcome: Progressing     Problem: INFECTION - ADULT  Goal: Absence or prevention of progression during hospitalization  Description: INTERVENTIONS:  - Assess and monitor for signs and symptoms of infection  - Monitor lab/diagnostic results  - Monitor all insertion sites, i.e. indwelling lines, tubes, and drains  - Monitor endotracheal if appropriate and nasal secretions for changes in amount and color  - [place] appropriate cooling/warming therapies per order  - Administer medications as ordered  - Instruct and encourage patient and family to use good hand hygiene technique  - Identify and instruct in appropriate isolation precautions for identified infection/condition  Outcome: Progressing     Problem: SAFETY ADULT  Goal: Patient will remain free of falls  Description: INTERVENTIONS:  - Educate patient/family on patient safety including physical limitations  - Instruct patient to call for assistance with activity   - Consult OT/PT to assist with strengthening/mobility   - Keep Call bell within reach  - Keep bed low and locked with side rails adjusted as appropriate  - Keep care items and personal belongings within reach  - Initiate and maintain comfort rounds  - Make Fall Risk Sign visible to staff    - Apply yellow socks and bracelet for high fall risk patients  - Consider moving patient to room near nurses station  Outcome: Progressing  Goal: Maintain or  return to baseline ADL function  Description: INTERVENTIONS:  -  Assess patient's ability to carry out ADLs; assess patient's baseline for ADL function and identify physical deficits which impact ability to perform ADLs (bathing, care of mouth/teeth, toileting, grooming, dressing, etc.)  - Assess/evaluate cause of self-care deficits   - Assess range of motion  - Assess patient's mobility; develop plan if impaired  - Assess patient's need for assistive devices and provide as appropriate  - Encourage maximum independence but intervene and supervise when necessary  - Involve family in performance of ADLs  - Assess for home care needs following discharge   - Consider OT consult to assist with ADL evaluation and planning for discharge  - Provide patient education as appropriate  Outcome: Progressing  Goal: Maintains/Returns to pre admission functional level  Description: INTERVENTIONS:  - Perform AM-PAC 6 Click Basic Mobility/ Daily Activity assessment daily.  - Set and communicate daily mobility goal to care team and patient/family/caregiver.   - Collaborate with rehabilitation services on mobility goals if consulted    - Out of bed for toileting  - Record patient progress and toleration of activity level   Outcome: Progressing     Problem: DISCHARGE PLANNING  Goal: Discharge to home or other facility with appropriate resources  Description: INTERVENTIONS:  - Identify barriers to discharge w/patient and caregiver  - Arrange for needed discharge resources and transportation as appropriate  - Identify discharge learning needs (meds, wound care, etc.)  - Arrange for interpretive services to assist at discharge as needed  - Refer to Case Management Department for coordinating discharge planning if the patient needs post-hospital services based on physician/advanced practitioner order or complex needs related to functional status, cognitive ability, or social support system  Outcome: Progressing     Problem: Knowledge  Deficit  Goal: Patient/family/caregiver demonstrates understanding of disease process, treatment plan, medications, and discharge instructions  Description: Complete learning assessment and assess knowledge base.  Interventions:  - Provide teaching at level of understanding  - Provide teaching via preferred learning methods  Outcome: Progressing     Problem: CARDIOVASCULAR - ADULT  Goal: Maintains optimal cardiac output and hemodynamic stability  Description: INTERVENTIONS:  - Monitor I/O, vital signs and rhythm  - Monitor for S/S and trends of decreased cardiac output SOB, leg swelling,  - Administer and titrate ordered vasoactive medications to optimize hemodynamic stability  - Assess quality of pulses, skin color and temperature  - Assess for signs of decreased coronary artery perfusion  - Instruct patient to report change in severity of symptoms  Outcome: Progressing  Goal: Absence of cardiac dysrhythmias or at baseline rhythm  Description: INTERVENTIONS:  - Continuous cardiac monitoring, vital signs, obtain 12 lead EKG if ordered  - Administer antiarrhythmic and heart rate control medications as ordered  - Monitor electrolytes and administer replacement therapy as ordered  Outcome: Progressing

## 2024-09-10 NOTE — ASSESSMENT & PLAN NOTE
Patient is status post Watchman  Previously on Xarelto, but secondary to GI bleeding/anemia she underwent Watchman with #31 mm [place] [employer] Flex Watchman device 2/9/2023 at [facility] in [facility]  History of tachy-kalyn syndrome. Not maintained on AV hitesh blocking agents.  Currently remains in rate controlled a fib this admission.

## 2024-09-10 NOTE — DISCHARGE SUMMARY
Discharge Summary - Hospitalist   Name: Violetta Olson 89 y.o. female I MRN: [id number]  Unit/Bed#: -01 I Date of Admission: 9/7/2024   Date of Service: 9/10/2024 I Hospital Day: 2     Assessment & Plan  Acute on chronic diastolic (congestive) heart failure (HCC)  Wt Readings from Last 3 Encounters:   09/10/24 88.2 kg (194 lb 6.4 oz)   06/04/24 89.7 kg (197 lb 12 oz)   03/31/21 94.8 kg (208 lb 15 oz)     Patient presents today (9/7) with complaints of persistent/progressive shortness of breath as well as bilateral lower extremity edema over the past several days  Patient reports compliance with home medication regimen including Lasix twice daily-follows regularly with cardiology in the outpatient setting, [facility]  Patient without complaints of chest pressure or palpitations    Patient received IV diuretics with good diuretic effects, which improved conditions clinically.  Cardiology evaluate the patient.  Echocardiogram which was done on 9/9/2024 shows ejection fraction 65% with mild AS, mild to moderate MR, mild MS, moderate TR with PASP 75 to 80 mmHg.  Per cardiology recommendation, patient is discharging with torsemide 60 mg daily, patient has upcoming appointment with her cardiologist Dr. Carpenter on 9/11/2024.        Longstanding persistent atrial fibrillation (HCC)  Patient is status post Watchman  Previously on Xarelto, but secondary to GI bleeding/anemia she underwent Watchman with #31 mm [place] [employer] Flex Watchman device 2/9/2023 at [facility]  History of tachy-kalyn syndrome. Not maintained on AV hitesh blocking agents.  Currently remains in rate controlled a fib this admission.  Morbid obesity (HCC)  Recommend incorporating a more whole foods plant-predominant diet along with decreasing consumption of red meats and processed foods  Per AHA guidelines, recommend moderate-vigorous intensity exercise for 30 minutes a day for 5 days a week or a total of 150  min/week  Essential hypertension  Soft blood pressures and bradycardic patient apparently is not on Norvasc at home and not on atenolol we will discontinue both lisinopril will be on hold while on diuresis  Other secondary gout, multiple sites  Chronic condition/stable  Chronic respiratory failure with hypoxia (HCC)  Patient chronically maintained on 2 L/min   Presents with saturations in the mid 90s on chronic 2 L  Continuous pulse oximetry  Chronic condition/stable  Liver cirrhosis (HCC)  Evident on CAT scan abdomen and pelvis follows with gastroenterology  Pancytopenia (HCC)  Suspect in light of liver cirrhosis    Discharging Physician / Practitioner: Marla Edwards MD  PCP: Luis E Hdez DO  Admission Date:   Admission Orders (From admission, onward)       Ordered        09/08/24 1015  INPATIENT ADMISSION  Once            09/07/24 2031  Place in Observation  Once                          Discharge Date: 09/10/24    Medical Problems       Resolved Problems  Date Reviewed: 9/9/2024   None         Consultations During Hospital Stay:  Cardiology, PT/OT    Procedures Performed:   XR hip/pelv 2-3 vws right if performed   Final Result by Roman Almanzar MD (09/09 1524)      No acute osseous abnormality.      Mild bilateral hip osteoarthritis.         Computerized Assisted Algorithm (CAA) may have been used to analyze all applicable images.      I have personally reviewed this study including all images.  / R.J.F.         Resident: Flaco Gan I, the attending radiologist, have reviewed the images and agree with the final report above.      Workstation performed: [id number]          VAS VENOUS DUPLEX - LOWER LIMB BILATERAL   Final Result by Kleber Toth MD (09/09 1135)      XR chest 1 view portable   ED Interpretation by Ilya Lopez MD (09/07 1907)   Cardiomegaly, increased central vascular congestion consistent with CHF.      Final Result by Olga Lidia Vitale MD (09/08 5219)       Enlarged cardiac silhouette. Pulmonary vascular congestion.            Workstation performed: [id number]           Echo complete :    Patient is in atrial fibrillation.    Left Ventricle: LV is normal in size and function. Estimated LVEF is 65%. No mau wall motion abnormality noted.  Mild concentric left ventricle hypertrophy is noted.    Aortic Valve: There is mild stenosis.    Mitral Valve: There is mild to moderate regurgitation. There is mild stenosis.    Tricuspid Valve: Tricuspid valve is structurally normal.  Moderate tricuspid regurgitation.  RVSP is 75-80 mmHg.    No in-house comparison study available.    Significant Findings / Test Results:   Lab Results   Component Value Date    WBC 2.72 (L) 09/09/2024    HGB 8.9 (L) 09/09/2024    HCT 29.4 (L) 09/09/2024     (H) 09/09/2024    PLT 69 (L) 09/09/2024     Lab Results   Component Value Date    SODIUM 140 09/10/2024    K 3.8 09/10/2024    CL 98 09/10/2024    CO2 39 (H) 09/10/2024    AGAP 3 (L) 09/10/2024    BUN 28 (H) 09/10/2024    CREATININE 1.01 09/10/2024    GLUC 92 09/10/2024    CALCIUM 8.8 09/10/2024    AST 27 09/07/2024    ALT 15 09/07/2024    ALKPHOS 41 09/07/2024    TP 7.6 09/07/2024    TBILI 0.66 09/07/2024    EGFR 48 09/10/2024         Incidental Findings:   As mentioned above    Test Results Pending at Discharge (will require follow up):   none     Outpatient Tests Requested:  none    Complications:  none    Reason for Admission: Shortness of breath, lower extremity swelling    Hospital Course:     Violetta Olson is a 89 y.o. female patient who originally presented to the hospital on 9/7/2024 due to shortness of breath, lower extremity swelling secondary to CHF exacerbation.  Patient received IV diuretics as per cardiology recommendation, with significantly improved.  Had repeat echocardiogram done.  Per cardiology recommendation, patient is discharging with torsemide 60 mg with resuming all other medications and follow-up with  "patient's own cardiology outpatient basis.  Patient does use 2 L of oxygen chronically which remained stable.  Discussed all results, labs, imaging findings, treatment plan and option discussed in details with patient's and family member at the bedside.  Verbalized understanding agrees.    Please see above list of diagnoses and related plan for additional information.     Condition at Discharge: stable     Discharge Day Visit / Exam:     Subjective: Seen and evaluated during the run.  Resting comfortably.  Feels much better.  Denies any significant complaint.  Vitals: Blood Pressure: 152/63 (09/10/24 0742)  Pulse: 65 (09/10/24 0742)  Temperature: 97.9 °F (36.6 °C) (09/10/24 0742)  Temp Source: Temporal (09/08/24 1900)  Respirations: 18 (09/10/24 0742)  Height: 5' 6\" (167.6 cm) (09/09/24 1130)  Weight - Scale: 88.2 kg (194 lb 6.4 oz) (09/10/24 0545)  SpO2: 97 % (09/10/24 0900)  Exam:   Physical Exam  Vitals and nursing note reviewed. Exam conducted with a chaperone present.   Constitutional:       Appearance: She is not ill-appearing or diaphoretic.   HENT:      Mouth/Throat:      Mouth: Mucous membranes are moist.      Pharynx: No oropharyngeal exudate.   Eyes:      General: No scleral icterus.        Left eye: No discharge.      Extraocular Movements: Extraocular movements intact.      Conjunctiva/sclera: Conjunctivae normal.      Pupils: Pupils are equal, round, and reactive to light.   Cardiovascular:      Rate and Rhythm: Normal rate.      Heart sounds: Murmur heard.      No friction rub. No gallop.   Pulmonary:      Effort: Pulmonary effort is normal. No respiratory distress.      Breath sounds: No stridor. No wheezing or rhonchi.   Musculoskeletal:      Right lower leg: No edema.      Left lower leg: No edema.   Neurological:      Mental Status: She is alert and oriented to person, place, and time.      Cranial Nerves: No cranial nerve deficit.      Sensory: No sensory deficit.      Motor: No weakness.      " Coordination: Coordination normal.       Discussion with Family: With patient daughter on the bedside    Discharge instructions/Information to patient and family:   See after visit summary for information provided to patient and family.      Provisions for Follow-Up Care:  See after visit summary for information related to follow-up care and any pertinent home health orders.      Disposition:     Home      Planned Readmission: If condition get worse     Discharge Statement:  Greater than 50% of the total time was spent examining patient, answering all patient questions, arranging and discussing plan of care with patient as well as directly providing post-discharge instructions.  Additional time then spent on discharge activities.    Discharge Medications:  See after visit summary for reconciled discharge medications provided to patient and family.      ** Please Note: This note has been constructed using a voice recognition system **

## 2024-09-10 NOTE — CONSULTS
"Consult received for CHF Ed.     Pt resides at home with son. Pt reports her and son cook meals together. Breakfast is typically always cereal with reduced fat milk. Lunch may be egg sandwich or deli meat sandwich or just sliced American cheese. Dinner is home prepared meal majority of the time. Reports not using salt shaker while cooking \"unless I'm making cabbage\". Reports drinking diet sodas, water, and gatorade regularly. Chart review of weight hx: 6/6/23 196lb, 2/20/24 192lb, 6/20/24 195lb, 9/10/24 194lb. Pt reports weighing herself daily at home, noticed weight gain up to 201lb on homescale.     Congratulated pt on dietary changes they have made to reduce sodium in diet thus far such as greatly reducing salt shaker use and primarily doing home prepared foods and some foods from scratch. Discussed with pt importance of strict low sodium diet compliance. Discussed avoidance of high sodium foods like American cheese, processed meats, gatorade (unless advised by MD otherwise), salt use. Encouraged continuation of daily weights and report significant changes to MD. Discussed current fluid restriction, foods/beverages included in this. Answered variety of nutrition questions pt had regarding diet recommendations. Provided with HF Nutrition Therapy, Salt Free Seasoning Tips, Fluid Restriction Nutrition Therapy handouts and RD contact information.   "

## 2024-09-10 NOTE — PROGRESS NOTES
Progress Note - Cardiology   Name: Violetta Olson 89 y.o. female I MRN: [id number]  Unit/Bed#: -01 I Date of Admission: 9/7/2024   Date of Service: 9/10/2024 I Hospital Day: 2     Assessment & Plan  Acute on chronic diastolic (congestive) heart failure (HCC)  Wt Readings from Last 3 Encounters:   09/10/24 88.2 kg (194 lb 6.4 oz)   06/04/24 89.7 kg (197 lb 12 oz)   03/31/21 94.8 kg (208 lb 15 oz)     History of HFpEF with valvular heart disease, pulmonary hypertension/portal hypertension with cirrhosis.  Exam is consistent with more of a right sided heart failure.  Echo 9/9/2024 shows LVEF 65% with mild AS, mild-mod MR, mild MS, mod TR with PASP 75-80mmHg. No significant change when compared to echo report from 2022.  Currently diuresing well with IV furosemide 40 mg BID.   Maintained on furosemide 60 mg BID outpatient.  Recommend transition to torsemide 60 mg daily today.  She is scheduled with her outpatient cardiologist Dr. Rolon tomorrow, Wednesday 9/11/2024.    Morbid obesity (HCC)    Essential hypertension  BP mildly elevated today.  Maintained on benazepril 10 mg daily outpatient,resume at discharge.    Longstanding persistent atrial fibrillation (HCC)  History of longstanding persistent a fib.  Previously on Xarelto, but secondary to GI bleeding/anemia she underwent Watchman with #31 mm [place] [employer] Flex Watchman device 2/9/2023 at [facility] in [facility]  History of tachy-kalyn syndrome. Not maintained on AV hitesh blocking agents.  Currently remains in rate controlled a fib this admission.  Other secondary gout, multiple sites    Chronic respiratory failure with hypoxia (HCC)  Secondary to HFrEF  Currently on home O2 requirement of 2 L NC  Liver cirrhosis (HCC)    Pancytopenia (HCC)  Off AC  Stable blood counts this admission  Platelet count today is 69.  Would recommend discontinuation of aspirin if platelet count < 50    History of Present Illness   Chief Complaint: dyspnea on exertion,  leg swelling    Subjective: Patient is sitting up in bed breathing comfortably on 2 L NC, which is her baseline requirement. She reports resolution of symptoms bringing her to the hospital and is anxious to go home. She has an appointment with her cardiologist Dr. Rolon tomorrow morning.     Objective      Temp:  [97.9 °F (36.6 °C)-98.6 °F (37 °C)] 97.9 °F (36.6 °C)  HR:  [44-65] 65  Resp:  [16-18] 18  BP: (102-152)/(48-63) 152/63  O2 Device: Nasal cannula   Vitals:    09/09/24 1130 09/10/24 0545   Weight: 88.9 kg (196 lb) 88.2 kg (194 lb 6.4 oz)     Orthostatic Blood Pressures      Flowsheet Row Most Recent Value   Blood Pressure 152/63 filed at 09/10/2024 0742   Patient Position - Orthostatic VS Sitting filed at 09/09/2024 0902             I/O last 24 hours:  In: 420 [P.O.:420]  Out: 1750 [Urine:1750]  Lines/Drains/Airways       Active Status       None                  Physical Exam  Vitals and nursing note reviewed.   Constitutional:       General: She is not in acute distress.     Appearance: Normal appearance.   HENT:      Head: Normocephalic.   Neck:      Vascular: No JVD.   Cardiovascular:      Rate and Rhythm: Normal rate and regular rhythm.      Pulses: Normal pulses.      Heart sounds: Murmur heard.      Systolic murmur is present with a grade of 4/6.   Pulmonary:      Effort: Pulmonary effort is normal.      Breath sounds: Normal breath sounds.      Comments: 2 L NC supplemental O2  Musculoskeletal:      Cervical back: Neck supple.      Right lower leg: No edema.      Left lower leg: No edema.   Skin:     General: Skin is warm and dry.   Neurological:      General: No focal deficit present.      Mental Status: She is alert and oriented to person, place, and time.   Psychiatric:         Mood and Affect: Mood normal.         Behavior: Behavior normal.          Lab Results: I have reviewed the following results: CBC/BMP:   .     09/10/24  0531   SODIUM 140   K 3.8   CL 98   CO2 39*   BUN 28*   CREATININE  1.01   GLUC 92    , Creatinine Clearance: Estimated Creatinine Clearance: 40.6 mL/min (by C-G formula based on SCr of 1.01 mg/dL).  Imaging Review: Reviewed radiology reports from this admission including: chest xray and Echocardiogram.  Other Studies: EKG was reviewed.

## 2024-09-11 NOTE — UTILIZATION REVIEW
NOTIFICATION OF ADMISSION DISCHARGE   This is a Notification of Discharge from [facility]. Please be advised that this patient has been discharge from our facility. Below you will find the admission and discharge date and time including the patient’s disposition.   UTILIZATION REVIEW CONTACT:  Michelle Jacobo  Utilization   Network Utilization Review Department  Phone: [phone] x carefully listen to the prompts. All voicemails are confidential.  Email: [email or web address]     ADMISSION INFORMATION  PRESENTATION DATE: 9/7/2024  6:07 PM  OBERVATION ADMISSION DATE: 09/07/2024 2031  INPATIENT ADMISSION DATE: 9/8/24 10:15 AM   DISCHARGE DATE: 9/10/2024 12:47 PM   DISPOSITION:Home/Self Care    Network Utilization Review Department  ATTENTION: Please call with any questions or concerns to [phone] and carefully listen to the prompts so that you are directed to the right person. All voicemails are confidential.   For Discharge needs, contact Care Management DC Support Team at [phone] opt. 2  Send all requests for admission clinical reviews, approved or denied determinations and any other requests to dedicated fax number below belonging to the campus where the patient is receiving treatment. List of dedicated fax numbers for the Facilities:  FACILITY NAME UR FAX NUMBER   ADMISSION DENIALS (Administrative/Medical Necessity) [phone]   DISCHARGE SUPPORT TEAM ([facility]) [phone]   PARENT CHILD HEALTH (Maternity/NICU/Pediatrics) [phone]   [facility] [phone]   [facility] [phone]   [facility] [phone]   [facility] [phone]   [facility] [phone]   [facility] [phone]   [facility] [phone]   [facility]  [place] [phone]   [facility] [phone]   [facility] [phone]   [facility] [phone]   [facility] [phone]